# Patient Record
Sex: FEMALE | Race: OTHER | Employment: FULL TIME | URBAN - METROPOLITAN AREA
[De-identification: names, ages, dates, MRNs, and addresses within clinical notes are randomized per-mention and may not be internally consistent; named-entity substitution may affect disease eponyms.]

---

## 2017-10-30 ENCOUNTER — OFFICE VISIT (OUTPATIENT)
Dept: FAMILY MEDICINE CLINIC | Age: 47
End: 2017-10-30

## 2017-10-30 VITALS
WEIGHT: 138 LBS | TEMPERATURE: 97.4 F | SYSTOLIC BLOOD PRESSURE: 134 MMHG | HEART RATE: 70 BPM | DIASTOLIC BLOOD PRESSURE: 72 MMHG | HEIGHT: 62 IN | BODY MASS INDEX: 25.4 KG/M2 | RESPIRATION RATE: 16 BRPM

## 2017-10-30 DIAGNOSIS — Z12.4 CERVICAL CANCER SCREENING: ICD-10-CM

## 2017-10-30 DIAGNOSIS — Z00.00 PHYSICAL EXAM: Primary | ICD-10-CM

## 2017-10-30 DIAGNOSIS — R01.1 HEART MURMUR: ICD-10-CM

## 2017-10-30 DIAGNOSIS — F41.9 ANXIETY: ICD-10-CM

## 2017-10-30 DIAGNOSIS — Z12.31 ENCOUNTER FOR SCREENING MAMMOGRAM FOR BREAST CANCER: ICD-10-CM

## 2017-10-30 PROCEDURE — 99396 PREV VISIT EST AGE 40-64: CPT | Performed by: FAMILY MEDICINE

## 2017-10-30 RX ORDER — CLONAZEPAM 0.5 MG/1
0.5 TABLET ORAL 2 TIMES DAILY PRN
Qty: 30 TABLET | Refills: 1 | Status: SHIPPED | OUTPATIENT
Start: 2017-10-30

## 2017-10-30 ASSESSMENT — PATIENT HEALTH QUESTIONNAIRE - PHQ9
SUM OF ALL RESPONSES TO PHQ QUESTIONS 1-9: 0
2. FEELING DOWN, DEPRESSED OR HOPELESS: 0
SUM OF ALL RESPONSES TO PHQ9 QUESTIONS 1 & 2: 0
1. LITTLE INTEREST OR PLEASURE IN DOING THINGS: 0

## 2017-10-30 NOTE — PATIENT INSTRUCTIONS
Thank you for enrolling in [address] Please follow the instructions below to securely access your online medical record. [employer] allows you to send messages to your doctor, view your test results, renew your prescriptions, schedule appointments, and more. How Do I Sign Up? 1. In your Internet browser, go to [email or web address]  2. Click on the Sign Up Now link in the Sign In box. You will see the New Member Sign Up page. 3. Enter your [employer] Access Code exactly as it appears below. You will not need to use this code after youve completed the sign-up process. If you do not sign up before the expiration date, you must request a new code. [employer] Access Code: [email or web address]  Expires: 12/29/2017 10:21 AM    4. Enter your Social Security Number ([id number]) and Date of Birth (mm/dd/yyyy) as indicated and click Submit. You will be taken to the next sign-up page. 5. Create a [employer] ID. This will be your [employer] login ID and cannot be changed, so think of one that is secure and easy to remember. 6. Create a [employer] password. You can change your password at any time. 7. Enter your Password Reset Question and Answer. This can be used at a later time if you forget your password. 8. Enter your e-mail address. You will receive e-mail notification when new information is available in [address] 9. Click Sign Up. You can now view your medical record. Additional Information  If you have questions, please contact your physician practice where you receive care. Remember, [employer] is NOT to be used for urgent needs. For medical emergencies, dial 911.

## 2017-11-03 LAB
HPV COMMENT: NORMAL
HPV TYPE 16: NOT DETECTED
HPV TYPE 18: NOT DETECTED
HPVOH (OTHER TYPES): NOT DETECTED

## 2017-11-08 LAB
ALBUMIN SERPL-MCNC: 4.9 G/DL
ALP BLD-CCNC: 48 U/L
ALT SERPL-CCNC: 20 U/L
ANION GAP SERPL CALCULATED.3IONS-SCNC: 12 MMOL/L
AST SERPL-CCNC: 14 U/L
BILIRUB SERPL-MCNC: 0.8 MG/DL (ref 0.1–1.4)
BUN BLDV-MCNC: NORMAL MG/DL
CALCIUM SERPL-MCNC: 9.8 MG/DL
CHLORIDE BLD-SCNC: 103 MMOL/L
CHOLESTEROL, TOTAL: 201 MG/DL
CHOLESTEROL/HDL RATIO: 2.6
CO2: NORMAL MMOL/L
CREAT SERPL-MCNC: 0.6 MG/DL
GFR CALCULATED: >60
GLUCOSE BLD-MCNC: 95 MG/DL
HDLC SERPL-MCNC: 78 MG/DL (ref 35–70)
LDL CHOLESTEROL CALCULATED: 112 MG/DL (ref 0–160)
POTASSIUM SERPL-SCNC: 3.9 MMOL/L
SODIUM BLD-SCNC: 141 MMOL/L
TOTAL PROTEIN: 7.2
TRIGL SERPL-MCNC: 54 MG/DL
VLDLC SERPL CALC-MCNC: 11 MG/DL

## 2017-11-28 ENCOUNTER — OFFICE VISIT (OUTPATIENT)
Dept: FAMILY MEDICINE CLINIC | Age: 47
End: 2017-11-28

## 2017-11-28 VITALS
OXYGEN SATURATION: 99 % | DIASTOLIC BLOOD PRESSURE: 80 MMHG | HEART RATE: 77 BPM | SYSTOLIC BLOOD PRESSURE: 112 MMHG | TEMPERATURE: 99 F | RESPIRATION RATE: 14 BRPM | HEIGHT: 62 IN

## 2017-11-28 DIAGNOSIS — Z30.433 ENCOUNTER FOR REMOVAL AND REINSERTION OF INTRAUTERINE CONTRACEPTIVE DEVICE (IUD): Primary | ICD-10-CM

## 2017-11-28 DIAGNOSIS — E78.89 ELEVATED HDL: ICD-10-CM

## 2017-11-28 LAB
CONTROL: PRESENT
PREGNANCY TEST URINE, POC: NEGATIVE

## 2017-11-28 PROCEDURE — 99213 OFFICE O/P EST LOW 20 MIN: CPT | Performed by: FAMILY MEDICINE

## 2017-11-28 PROCEDURE — 58301 REMOVE INTRAUTERINE DEVICE: CPT | Performed by: FAMILY MEDICINE

## 2017-11-28 PROCEDURE — 81025 URINE PREGNANCY TEST: CPT | Performed by: FAMILY MEDICINE

## 2017-11-28 PROCEDURE — 58300 INSERT INTRAUTERINE DEVICE: CPT | Performed by: FAMILY MEDICINE

## 2017-11-28 NOTE — PROGRESS NOTES
Subjective  Paula Herrera, 52 y.o. female presents today with:  Chief Complaint   Patient presents with    Contraception     iud removal and insertion    Other     would like order for lipid blood work     Is on keto diet wants to recheck cho in spring        Past Medical History:   Diagnosis Date    Anxiety     Calcaneal fracture     Depression     Diverticulosis     Heart murmur     Toxemia      Past Surgical History:   Procedure Laterality Date    COLONOSCOPY  9/30/13,16    DR. BATEMAN,polyp needs 2019    INTRAUTERINE DEVICE INSERTION  1-2013    mirena     Social History     Social History    Marital status: Single     Spouse name: N/A    Number of children: N/A    Years of education: N/A     Occupational History    Not on file.      Social History Main Topics    Smoking status: Never Smoker    Smokeless tobacco: Never Used    Alcohol use Yes      Comment: occ    Drug use: Unknown    Sexual activity: Not on file     Other Topics Concern    Not on file     Social History Narrative    No narrative on file     Family History   Problem Relation Age of Onset    High Blood Pressure Mother     High Blood Pressure Father     Alcohol Abuse Father     Cancer Other      RECTAL     No Known Allergies  Current Outpatient Prescriptions   Medication Sig Dispense Refill    levonorgestrel (MIRENA, 52 MG,) IUD 52 mg 1 each by Intrauterine route once      clonazePAM (KLONOPIN) 0.5 MG tablet Take 1 tablet by mouth 2 times daily as needed for Anxiety 30 tablet 1    Omeprazole Magnesium (PRILOSEC OTC PO) Take 2 capsules by mouth daily      Ranitidine HCl (ZANTAC PO) Take by mouth daily      FIBER PO Take by mouth      Multiple Vitamins-Minerals (MULTIVITAMIN PO) Take by mouth      pantoprazole (PROTONIX) 40 MG tablet Take 1 tablet by mouth 2 times daily 60 tablet 1    sucralfate (CARAFATE) 1 GM tablet Take 1 tablet by mouth 4 times daily 120 tablet 1     Current Facility-Administered Medications Medication Dose Route Frequency Provider Last Rate Last Dose    levonorgestrel (MIRENA) IUD 52 mg 1 each  1 each Intrauterine Once Augusta Herrera MD         The patient denies any history of      seizures,             heart attack or KNOWN CAD        or stroke. No chest pain, shortness of breath, paroxysmal nocturnal dyspnea. No nausea, vomiting, diarrhea, hematochezia or melena. No paresthesias or headaches. No dysuria, frequency or hematuria. Last labs  Orders Only on 11/14/2017   Component Date Value Ref Range Status    Sodium 11/08/2017 141  mmol/L Final    Chloride 11/08/2017 103  mmol/L Final    Potassium 11/08/2017 3.9  mmol/L Final    CREATININE 11/08/2017 0.60   Final    Glucose 11/08/2017 95  mg/dL Final    AST 11/08/2017 14  U/L Final    ALT 11/08/2017 20  U/L Final    Calcium 11/08/2017 9.8  mg/dL Final    Total Protein 11/08/2017 7.2   Final    Alb 11/08/2017 4.9   Final    Alkaline Phosphatase 11/08/2017 48  U/L Final    Total Bilirubin 11/08/2017 0.8  0.1 - 1.4 mg/dL Final    Gfr Calculated 11/08/2017 >60   Final    Anion Gap 11/08/2017 12  mmol/L Final    Cholesterol, Total 11/08/2017 201  mg/dL Final    HDL 11/08/2017 78* 35 - 70 mg/dL Final    LDL Calculated 11/08/2017 112  0 - 160 mg/dL Final    Triglycerides 11/08/2017 54  mg/dL Final    Chol/HDL Ratio 11/08/2017 2.6   Final    VLDL 11/08/2017 11  mg/dL Final   Orders Only on 10/30/2017   Component Date Value Ref Range Status    HPV TYPE 16 11/03/2017 Not Detected  Not Detected Final    HPV TYPE 18 11/03/2017 Not Detected  Not Detected Final    HPVOH (OTHER TYPES) 11/03/2017 Not Detected  Not Detected Final    HPV Comment 11/03/2017 See below   Final    Comment: **This is information only. See above for results. **    HPV other genotypes: 13,68,68,14,93,62,18,04,16,70,66,42    The [employer] Danita HPV Test is a qualitative in-vitro test for  the detection of Human Papillomavirus that provides specific  genotyping information for HPV Types 16 and 18, while  concurrently detecting 12 other high-risk HPV types 31,33,35,  84,88,65,54,71,68,70,84,02 in a pooled result. The test  utilizes amplification of target DNA by Polymerase Chain  Reaction (PCR) and nucleic acid hybridization. [facility]   Topic Date Due    HIV screen  04/17/1985    DTaP/Tdap/Td vaccine (1 - Tdap) 04/30/2018 (Originally 4/17/1989)    Cervical cancer screen  10/30/2022    Lipid screen  11/08/2022    Flu vaccine  Completed       Results for POC orders placed in visit on 11/28/17   POCT urine pregnancy   Result Value Ref Range    Preg Test, Ur negative     Control present          Objective    Vitals:    11/28/17 1350   BP: 112/80   Pulse: 77   Resp: 14   Temp: 99 °F (37.2 °C)   TempSrc: Temporal   SpO2: 99%   Height: 5' 2\" (1.575 m)       PHYSICAL EXAMINATION:        GENERAL:    The patient appears well nourished and well-developed,     Normal affect. Not appearing significantly anxious or depressed. No acute respiratory distress. Alert and oriented times 3. Skin:     No skin rashes. No concerning moles observed. Gait:    Normal gait. No ataxia. HEENT:  Normocephalic, atraumatic. Throat:  Pharynx is clear, no erythema/ edema or exudates   Ears:    TMs normal bilaterally. Canals and ears normal   Eyes:  Extraocular eye motions intact and pain free. Pupils reactive/equal    Sclerae and conjunctivae clear    NECK: No masses or adenopathy palpable. No carotid bruits heard. No asymmetry visible. No thyromegaly. RESPIRATORY:   Clear/ Equal breath sounds /No acute respiratory distress. No wheezes,rales, or rhonchi. No percussive abnormalities    HEART: Regular rhythm without murmur, rub or gallop. ABDOMEN:  Soft, non tender. No masses, guarding or rebound. Normo active bowel sounds. EXTREMITIES:  No edema in any extremity. No cyanosis or clubbing.     2+ dorsalis pedis pulses bilaterally    iud removed sans difficulty   Fully consented for IUD insertion d/w pt risks of bleeding/uterine rupture/iud migration/pregnancy threatening and termination if happened to be early in pregnancy or pregnancy w IUD in -unlikely but possible which can have complications or death resulting to fetus or pt. Pt. prepped and draped in normal fashion cleansed cervix w betadine, small amt of blood from end of menses present and urine pregnancy (-)   Inserted mirena sans difficulty. Strings trimmed  Minimal discomfort or cramping. Signs/symptoms of concern including but not limited to cramping/excessive bleeding/IUD expulsion etc d/w pt necessitating call or ER. Follow up needed for IUD check in 2 months. Assessment & Plan   1. Encounter for removal and reinsertion of intrauterine contraceptive device (IUD)  levonorgestrel (MIRENA) IUD 52 mg 1 each    44366 - [place] INSERT INTRAUTERINE DEVICE    92784 - [place] REMOVE INTRAUTERINE DEVICE    POCT urine pregnancy   2. Elevated HDL  Lipid Panel     Orders Placed This Encounter   Procedures    Lipid Panel     Standing Status:   Future     Standing Expiration Date:   11/28/2018     Order Specific Question:   Is Patient Fasting?/# of Hours     Answer:   ?    POCT urine pregnancy    27124 - [place] INSERT INTRAUTERINE DEVICE    39785 - [place] REMOVE INTRAUTERINE DEVICE     Orders Placed This Encounter   Medications    levonorgestrel (MIRENA) IUD 52 mg 1 each     There are no discontinued medications. No Follow-up on file.   Signs/sxs of concern d/w pt    Lowell Wright MD

## 2017-12-05 ENCOUNTER — TELEPHONE (OUTPATIENT)
Dept: FAMILY MEDICINE CLINIC | Age: 47
End: 2017-12-05

## 2017-12-05 DIAGNOSIS — R92.8 ABNORMAL MAMMOGRAM: Primary | ICD-10-CM

## 2017-12-27 DIAGNOSIS — R92.8 ABNORMAL MAMMOGRAM: ICD-10-CM

## 2018-05-23 ENCOUNTER — TELEPHONE (OUTPATIENT)
Dept: FAMILY MEDICINE CLINIC | Age: 48
End: 2018-05-23

## 2018-06-21 ENCOUNTER — PATIENT MESSAGE (OUTPATIENT)
Dept: FAMILY MEDICINE CLINIC | Age: 48
End: 2018-06-21

## 2018-06-22 RX ORDER — CIPROFLOXACIN 500 MG/1
500 TABLET, FILM COATED ORAL 2 TIMES DAILY
Qty: 20 TABLET | Refills: 0 | Status: SHIPPED | OUTPATIENT
Start: 2018-06-22 | End: 2018-07-02

## 2018-06-22 RX ORDER — FLUCONAZOLE 150 MG/1
150 TABLET ORAL ONCE
Qty: 1 TABLET | Refills: 0 | Status: SHIPPED | OUTPATIENT
Start: 2018-06-22 | End: 2018-06-22

## 2018-06-22 RX ORDER — METRONIDAZOLE 500 MG/1
500 TABLET ORAL 3 TIMES DAILY
Qty: 30 TABLET | Refills: 0 | Status: SHIPPED | OUTPATIENT
Start: 2018-06-22 | End: 2018-07-02

## 2018-08-23 ENCOUNTER — PATIENT MESSAGE (OUTPATIENT)
Dept: FAMILY MEDICINE CLINIC | Age: 48
End: 2018-08-23

## 2018-08-23 DIAGNOSIS — R10.13 EPIGASTRIC PAIN: ICD-10-CM

## 2018-08-24 RX ORDER — ONDANSETRON 4 MG/1
4 TABLET, FILM COATED ORAL EVERY 8 HOURS PRN
Qty: 40 TABLET | Refills: 1 | Status: SHIPPED | OUTPATIENT
Start: 2018-08-24

## 2018-08-24 RX ORDER — PANTOPRAZOLE SODIUM 40 MG/1
40 TABLET, DELAYED RELEASE ORAL 2 TIMES DAILY
Qty: 60 TABLET | Refills: 1 | Status: SHIPPED | OUTPATIENT
Start: 2018-08-24

## 2018-08-24 RX ORDER — SUCRALFATE 1 G/1
1 TABLET ORAL 4 TIMES DAILY
Qty: 120 TABLET | Refills: 1 | Status: SHIPPED | OUTPATIENT
Start: 2018-08-24

## 2023-01-18 ENCOUNTER — HOSPITAL ENCOUNTER (OUTPATIENT)
Dept: LAB | Age: 53
Discharge: HOME | End: 2023-01-18
Payer: COMMERCIAL

## 2023-01-18 LAB
ALBUMIN SERPL-MCNC: 4.5 G/DL (ref 3.5–4.6)
ALP BLD-CCNC: 67 U/L (ref 40–130)
ALT SERPL-CCNC: 11 U/L (ref 0–33)
ANION GAP SERPL CALCULATED.3IONS-SCNC: 10 MEQ/L (ref 9–15)
AST SERPL-CCNC: 14 U/L (ref 0–35)
BILIRUB SERPL-MCNC: 0.6 MG/DL (ref 0.2–0.7)
BUN BLDV-MCNC: 13 MG/DL (ref 6–20)
CALCIUM SERPL-MCNC: 9.2 MG/DL (ref 8.5–9.9)
CHLORIDE BLD-SCNC: 106 MEQ/L (ref 95–107)
CHOLESTEROL, TOTAL: 178 MG/DL (ref 0–199)
CO2: 26 MEQ/L (ref 20–31)
CREAT SERPL-MCNC: 0.55 MG/DL (ref 0.5–0.9)
FOLLICLE STIMULATING HORMONE: 62.2 MIU/ML (ref 1.7–21.5)
GFR SERPL CREATININE-BSD FRML MDRD: >60 ML/MIN/{1.73_M2}
GLOBULIN: 1.8 G/DL (ref 2.3–3.5)
GLUCOSE BLD-MCNC: 87 MG/DL (ref 70–99)
HBA1C MFR BLD: 5.5 % (ref 4.8–5.9)
HCT VFR BLD CALC: 38.9 % (ref 37–47)
HDLC SERPL-MCNC: 59 MG/DL (ref 40–59)
HEMOGLOBIN: 13.3 G/DL (ref 12–16)
LDL CHOLESTEROL CALCULATED: 102 MG/DL (ref 0–129)
LH: 44.9 MIU/ML (ref 1–95.6)
MCH RBC QN AUTO: 29.5 PG (ref 27–31.3)
MCHC RBC AUTO-ENTMCNC: 34.1 % (ref 33–37)
MCV RBC AUTO: 86.5 FL (ref 79.4–94.8)
PDW BLD-RTO: 12.7 % (ref 11.5–14.5)
PLATELET # BLD: 274 K/UL (ref 130–400)
POTASSIUM SERPL-SCNC: 4.3 MEQ/L (ref 3.4–4.9)
RBC # BLD: 4.5 M/UL (ref 4.2–5.4)
SODIUM BLD-SCNC: 142 MEQ/L (ref 135–144)
T4 FREE: 1.17 NG/DL (ref 0.84–1.68)
TOTAL PROTEIN: 6.3 G/DL (ref 6.3–8)
TRIGL SERPL-MCNC: 85 MG/DL (ref 0–150)
TSH SERPL DL<=0.05 MIU/L-ACNC: 1.95 UIU/ML (ref 0.44–3.86)
WBC # BLD: 5.2 K/UL (ref 4.8–10.8)

## 2023-01-18 PROCEDURE — 84439 ASSAY OF FREE THYROXINE: CPT

## 2023-01-18 PROCEDURE — 83036 HEMOGLOBIN GLYCOSYLATED A1C: CPT

## 2023-01-18 PROCEDURE — 85027 COMPLETE CBC AUTOMATED: CPT

## 2023-01-18 PROCEDURE — 84443 ASSAY THYROID STIM HORMONE: CPT

## 2023-01-18 PROCEDURE — 83001 ASSAY OF GONADOTROPIN (FSH): CPT

## 2023-01-18 PROCEDURE — 80061 LIPID PANEL: CPT

## 2023-01-18 PROCEDURE — 80053 COMPREHEN METABOLIC PANEL: CPT

## 2023-01-18 PROCEDURE — 36415 COLL VENOUS BLD VENIPUNCTURE: CPT

## 2023-01-18 PROCEDURE — 83002 ASSAY OF GONADOTROPIN (LH): CPT

## 2023-01-24 ENCOUNTER — HOSPITAL ENCOUNTER (OUTPATIENT)
Dept: WOMENS IMAGING | Age: 53
Discharge: HOME | End: 2023-01-26
Payer: COMMERCIAL

## 2023-01-24 DIAGNOSIS — Z12.31 ENCOUNTER FOR SCREENING MAMMOGRAM FOR MALIGNANT NEOPLASM OF BREAST: ICD-10-CM

## 2023-01-24 PROCEDURE — 77067 SCR MAMMO BI INCL CAD: CPT

## 2023-08-03 RX ORDER — NEBIVOLOL 5 MG/1
5 TABLET ORAL DAILY
COMMUNITY
Start: 2023-02-20 | End: 2023-08-07

## 2023-08-07 DIAGNOSIS — R73.9 HYPERGLYCEMIA: ICD-10-CM

## 2023-08-07 DIAGNOSIS — I10 HYPERTENSION, UNSPECIFIED TYPE: ICD-10-CM

## 2023-08-07 RX ORDER — LOSARTAN POTASSIUM 50 MG/1
50 TABLET ORAL DAILY
COMMUNITY
End: 2024-01-10 | Stop reason: SDUPTHER

## 2023-09-08 ENCOUNTER — HOSPITAL ENCOUNTER (OUTPATIENT)
Age: 53
Setting detail: SPECIMEN
Discharge: HOME | End: 2023-09-08
Payer: COMMERCIAL

## 2023-09-08 ENCOUNTER — OFFICE VISIT (OUTPATIENT)
Dept: FAMILY MEDICINE CLINIC | Age: 53
End: 2023-09-08
Payer: COMMERCIAL

## 2023-09-08 VITALS
SYSTOLIC BLOOD PRESSURE: 118 MMHG | HEART RATE: 61 BPM | DIASTOLIC BLOOD PRESSURE: 76 MMHG | HEIGHT: 62 IN | BODY MASS INDEX: 28.3 KG/M2 | WEIGHT: 153.8 LBS | OXYGEN SATURATION: 96 %

## 2023-09-08 DIAGNOSIS — N30.00 ACUTE CYSTITIS WITHOUT HEMATURIA: Primary | ICD-10-CM

## 2023-09-08 DIAGNOSIS — R39.9 UTI SYMPTOMS: ICD-10-CM

## 2023-09-08 DIAGNOSIS — J01.10 ACUTE NON-RECURRENT FRONTAL SINUSITIS: ICD-10-CM

## 2023-09-08 DIAGNOSIS — N30.00 ACUTE CYSTITIS WITHOUT HEMATURIA: ICD-10-CM

## 2023-09-08 LAB
BILIRUBIN, POC: NORMAL
BLOOD URINE, POC: NORMAL
CLARITY, POC: CLEAR
COLOR, POC: YELLOW
GLUCOSE URINE, POC: NORMAL
KETONES, POC: NORMAL
LEUKOCYTE EST, POC: 70
NITRITE, POC: NORMAL
PH, POC: 7
PROTEIN, POC: 0.15
SPECIFIC GRAVITY, POC: 1.01
UROBILINOGEN, POC: 3.5

## 2023-09-08 PROCEDURE — 87086 URINE CULTURE/COLONY COUNT: CPT

## 2023-09-08 PROCEDURE — 99203 OFFICE O/P NEW LOW 30 MIN: CPT

## 2023-09-08 PROCEDURE — 81002 URINALYSIS NONAUTO W/O SCOPE: CPT

## 2023-09-08 RX ORDER — LOSARTAN POTASSIUM 50 MG/1
50 TABLET ORAL DAILY
COMMUNITY

## 2023-09-08 RX ORDER — AMOXICILLIN AND CLAVULANATE POTASSIUM 875; 125 MG/1; MG/1
1 TABLET, FILM COATED ORAL 2 TIMES DAILY
Qty: 20 TABLET | Refills: 0 | Status: SHIPPED | OUTPATIENT
Start: 2023-09-08 | End: 2023-09-18

## 2023-09-08 ASSESSMENT — ENCOUNTER SYMPTOMS
NAUSEA: 0
BACK PAIN: 0
COUGH: 0
RESPIRATORY NEGATIVE: 1
ABDOMINAL PAIN: 0
SINUS PAIN: 1

## 2023-09-08 NOTE — PATIENT INSTRUCTIONS
Increase water intake and reduced intake of sweets and sweetened beverages for the next few days. Take full course of your antibiotics, even if symptoms improve. If not seeing resolution of symptoms in 2 days then follow-up for reevaluation.

## 2023-09-10 LAB — BACTERIA UR CULT: NORMAL

## 2023-11-27 RX ORDER — BUPROPION HYDROCHLORIDE 150 MG/1
150 TABLET ORAL DAILY
Qty: 90 TABLET | Refills: 0 | Status: CANCELLED | OUTPATIENT
Start: 2023-11-27

## 2023-11-27 RX ORDER — BUPROPION HYDROCHLORIDE 150 MG/1
1 TABLET ORAL DAILY
COMMUNITY
Start: 2021-04-13

## 2023-12-28 PROBLEM — G47.9 SLEEP DIFFICULTIES: Status: ACTIVE | Noted: 2023-12-28

## 2023-12-28 PROBLEM — M22.2X1 PATELLOFEMORAL PAIN SYNDROME OF RIGHT KNEE: Status: ACTIVE | Noted: 2023-12-28

## 2023-12-28 PROBLEM — K25.9 STOMACH ULCER: Status: ACTIVE | Noted: 2023-12-28

## 2023-12-28 PROBLEM — K59.09 CHRONIC CONSTIPATION: Status: ACTIVE | Noted: 2023-12-28

## 2023-12-28 PROBLEM — K57.32 DIVERTICULITIS OF COLON: Status: ACTIVE | Noted: 2023-12-28

## 2023-12-28 PROBLEM — K57.90 DIVERTICULOSIS OF INTESTINE: Status: ACTIVE | Noted: 2023-12-28

## 2023-12-28 PROBLEM — R92.8 ABNORMAL MAMMOGRAM: Status: ACTIVE | Noted: 2023-12-28

## 2023-12-28 PROBLEM — L91.0 KELOID CICATRIX: Status: ACTIVE | Noted: 2023-12-28

## 2023-12-28 PROBLEM — L91.0 HYPERTROPHIC SCAR: Status: ACTIVE | Noted: 2023-12-28

## 2023-12-28 PROBLEM — H90.3 BILATERAL SENSORINEURAL HEARING LOSS: Status: ACTIVE | Noted: 2023-12-28

## 2024-01-10 ENCOUNTER — HOSPITAL ENCOUNTER (OUTPATIENT)
Dept: RADIOLOGY | Facility: EXTERNAL LOCATION | Age: 54
Discharge: HOME | End: 2024-01-10

## 2024-01-10 ENCOUNTER — OFFICE VISIT (OUTPATIENT)
Dept: PRIMARY CARE | Facility: CLINIC | Age: 54
End: 2024-01-10
Payer: COMMERCIAL

## 2024-01-10 VITALS
DIASTOLIC BLOOD PRESSURE: 86 MMHG | OXYGEN SATURATION: 98 % | WEIGHT: 155 LBS | HEART RATE: 58 BPM | SYSTOLIC BLOOD PRESSURE: 138 MMHG | RESPIRATION RATE: 20 BRPM | HEIGHT: 62 IN | TEMPERATURE: 97.5 F | BODY MASS INDEX: 28.52 KG/M2

## 2024-01-10 DIAGNOSIS — F41.9 ANXIETY AND DEPRESSION: ICD-10-CM

## 2024-01-10 DIAGNOSIS — Z12.31 ENCOUNTER FOR SCREENING MAMMOGRAM FOR BREAST CANCER: Primary | ICD-10-CM

## 2024-01-10 DIAGNOSIS — F41.9 ANXIETY: ICD-10-CM

## 2024-01-10 DIAGNOSIS — Z13.220 LIPID SCREENING: ICD-10-CM

## 2024-01-10 DIAGNOSIS — F32.A ANXIETY AND DEPRESSION: ICD-10-CM

## 2024-01-10 DIAGNOSIS — I10 HYPERTENSION, UNSPECIFIED TYPE: ICD-10-CM

## 2024-01-10 DIAGNOSIS — Z12.31 ENCOUNTER FOR SCREENING MAMMOGRAM FOR BREAST CANCER: ICD-10-CM

## 2024-01-10 PROCEDURE — 3075F SYST BP GE 130 - 139MM HG: CPT | Performed by: PHYSICIAN ASSISTANT

## 2024-01-10 PROCEDURE — 3079F DIAST BP 80-89 MM HG: CPT | Performed by: PHYSICIAN ASSISTANT

## 2024-01-10 PROCEDURE — 1036F TOBACCO NON-USER: CPT | Performed by: PHYSICIAN ASSISTANT

## 2024-01-10 PROCEDURE — 99213 OFFICE O/P EST LOW 20 MIN: CPT | Performed by: PHYSICIAN ASSISTANT

## 2024-01-10 RX ORDER — SERTRALINE HYDROCHLORIDE 25 MG/1
25 TABLET, FILM COATED ORAL DAILY
Qty: 30 TABLET | Refills: 1 | Status: SHIPPED | OUTPATIENT
Start: 2024-01-10 | End: 2024-03-19 | Stop reason: SDUPTHER

## 2024-01-10 RX ORDER — BISMUTH SUBSALICYLATE 262 MG
1 TABLET,CHEWABLE ORAL DAILY
COMMUNITY

## 2024-01-10 RX ORDER — BUSPIRONE HYDROCHLORIDE 10 MG/1
10 TABLET ORAL 3 TIMES DAILY PRN
Qty: 90 TABLET | Refills: 1 | Status: SHIPPED | OUTPATIENT
Start: 2024-01-10

## 2024-01-10 RX ORDER — LOSARTAN POTASSIUM 50 MG/1
50 TABLET ORAL DAILY
Qty: 90 TABLET | Refills: 1 | Status: SHIPPED | OUTPATIENT
Start: 2024-01-10 | End: 2024-05-08 | Stop reason: WASHOUT

## 2024-01-10 ASSESSMENT — ANXIETY QUESTIONNAIRES
IF YOU CHECKED OFF ANY PROBLEMS ON THIS QUESTIONNAIRE, HOW DIFFICULT HAVE THESE PROBLEMS MADE IT FOR YOU TO DO YOUR WORK, TAKE CARE OF THINGS AT HOME, OR GET ALONG WITH OTHER PEOPLE: SOMEWHAT DIFFICULT
1. FEELING NERVOUS, ANXIOUS, OR ON EDGE: SEVERAL DAYS
7. FEELING AFRAID AS IF SOMETHING AWFUL MIGHT HAPPEN: NOT AT ALL
3. WORRYING TOO MUCH ABOUT DIFFERENT THINGS: NEARLY EVERY DAY
2. NOT BEING ABLE TO STOP OR CONTROL WORRYING: MORE THAN HALF THE DAYS
4. TROUBLE RELAXING: NEARLY EVERY DAY
6. BECOMING EASILY ANNOYED OR IRRITABLE: SEVERAL DAYS
5. BEING SO RESTLESS THAT IT IS HARD TO SIT STILL: SEVERAL DAYS
GAD7 TOTAL SCORE: 11

## 2024-01-10 ASSESSMENT — PATIENT HEALTH QUESTIONNAIRE - PHQ9
4. FEELING TIRED OR HAVING LITTLE ENERGY: SEVERAL DAYS
SUM OF ALL RESPONSES TO PHQ9 QUESTIONS 1 AND 2: 2
2. FEELING DOWN, DEPRESSED OR HOPELESS: SEVERAL DAYS
9. THOUGHTS THAT YOU WOULD BE BETTER OFF DEAD, OR OF HURTING YOURSELF: NOT AT ALL
1. LITTLE INTEREST OR PLEASURE IN DOING THINGS: SEVERAL DAYS
SUM OF ALL RESPONSES TO PHQ QUESTIONS 1-9: 14
8. MOVING OR SPEAKING SO SLOWLY THAT OTHER PEOPLE COULD HAVE NOTICED. OR THE OPPOSITE, BEING SO FIGETY OR RESTLESS THAT YOU HAVE BEEN MOVING AROUND A LOT MORE THAN USUAL: MORE THAN HALF THE DAYS
6. FEELING BAD ABOUT YOURSELF - OR THAT YOU ARE A FAILURE OR HAVE LET YOURSELF OR YOUR FAMILY DOWN: NEARLY EVERY DAY
3. TROUBLE FALLING OR STAYING ASLEEP OR SLEEPING TOO MUCH: NEARLY EVERY DAY
5. POOR APPETITE OR OVEREATING: NOT AT ALL
7. TROUBLE CONCENTRATING ON THINGS, SUCH AS READING THE NEWSPAPER OR WATCHING TELEVISION: NEARLY EVERY DAY

## 2024-01-10 NOTE — PROGRESS NOTES
"Subjective   Patient ID: Monet Aguilar is a 53 y.o. female who presents for Breast Pain (Dr Mlaave pt here today for right sided breast pain since end of Nov, beginning of Dec with nipple itching that she has had for years since her breast augmentation years ago. States the pain is gone now. Pt states last Mamm was Jan 2023, so needs an order printed because she goes to [employer]. ) and Follow-up (Pt would also like routine blood work printed because she goes through [employer] [facility] [employer]; along with hormone level to possibly get her Mirena taken out. Pt wanting Xanax for her nerves due to her father just passing away and having to take care of everything and not sleeping. Wants back on Wellbutrin as well. ).    HPI     Right breast pain:   Had breast aug years ago and ever sincfe then has had some itching   End of Nove and beg dec started having breast pain near a[place]ola - tender and hjurt and itching got really bad at the same time.   Was using heating pad and doing breast massage - those helped because pain has resolved now and itching is minimal and nearly gone     Anxiety and depression  - father passed away and is handlign his estate   - Mom had a stroke   - A lot of anxiety with everything, not sleeping much   - In general has underlying anxiety and depression   - Coping strategies: keeping busy, cries a lot   - Used to be on Wellbutrin (has been off for over a year because was doing so well)   - Talked to a counselor - didn't have a good connection   - Low libido lately   - Tried Lexapro before years ago (can't remember who she did with that), was on Celexa years ago in her 20s, Effexor (can't remember)   - Has tried Xanax and Klonopin     Review of Systems   Psychiatric/Behavioral:  Positive for dysphoric mood. The patient is nervous/anxious.        Objective   /86   Pulse 58   Temp 36.4 °C (97.5 °F)   Resp 20   Ht 1.575 m (5' 2\")   Wt 70.3 kg (155 lb)   SpO2 98%   BMI 28.35 kg/m²     Physical " Exam  Constitutional:       Appearance: Normal appearance.   Cardiovascular:      Rate and Rhythm: Normal rate and regular rhythm.      Pulses: Normal pulses.      Heart sounds: Normal heart sounds. No murmur heard.  Pulmonary:      Effort: Pulmonary effort is normal.      Breath sounds: Normal breath sounds.   Neurological:      Mental Status: She is alert.   Psychiatric:         Mood and Affect: Mood and affect normal.         Behavior: Behavior normal.         Thought Content: Thought content normal.         Judgment: Judgment normal.         Assessment/Plan     Problem List Items Addressed This Visit    None  Visit Diagnoses       Encounter for screening mammogram for breast cancer    -  Primary    Relevant Orders    BI mammo bilateral screening tomosynthesis (Completed)    Hypertension, unspecified type        Relevant Medications    losartan (Cozaar) 50 mg tablet    Other Relevant Orders    Lipid Panel    CBC    Comprehensive Metabolic Panel    Hemoglobin A1C    Vitamin B12    FSH & LH    Anxiety and depression        Relevant Medications    sertraline (Zoloft) 25 mg tablet    Other Relevant Orders    Follow Up In [facility]    Lipid Panel    CBC    Comprehensive Metabolic Panel    Hemoglobin A1C    Vitamin B12    FSH & LH    Anxiety        Relevant Medications    busPIRone (Buspar) 10 mg tablet    Lipid screening        Relevant Orders    Lipid Panel            Breast pain has now resolved but due for mammogram - ordered today     Anxiety and depression worse again now after loss of her father and trying to handle his estate.   - Has tried a few meds previous   - Discussed options at length today - rx sent for Zoloft and buspar   - Referral placed to [facility] for counseling

## 2024-01-11 ASSESSMENT — ENCOUNTER SYMPTOMS
NERVOUS/ANXIOUS: 1
DYSPHORIC MOOD: 1

## 2024-02-01 ENCOUNTER — HOSPITAL ENCOUNTER (OUTPATIENT)
Dept: WOMENS IMAGING | Age: 54
Discharge: HOME | End: 2024-02-03
Payer: COMMERCIAL

## 2024-02-01 ENCOUNTER — HOSPITAL ENCOUNTER (OUTPATIENT)
Dept: LAB | Age: 54
Discharge: HOME | End: 2024-02-01
Payer: COMMERCIAL

## 2024-02-01 DIAGNOSIS — Z12.31 ENCOUNTER FOR SCREENING MAMMOGRAM FOR MALIGNANT NEOPLASM OF BREAST: ICD-10-CM

## 2024-02-01 LAB
ALBUMIN SERPL-MCNC: 4.5 G/DL (ref 3.5–4.6)
ALP SERPL-CCNC: 72 U/L (ref 40–130)
ALT SERPL-CCNC: 20 U/L (ref 0–33)
ANION GAP SERPL CALCULATED.3IONS-SCNC: 10 MEQ/L (ref 9–15)
AST SERPL-CCNC: 17 U/L (ref 0–35)
BILIRUB SERPL-MCNC: 0.5 MG/DL (ref 0.2–0.7)
BUN SERPL-MCNC: 17 MG/DL (ref 6–20)
CALCIUM SERPL-MCNC: 9.3 MG/DL (ref 8.5–9.9)
CHLORIDE SERPL-SCNC: 106 MEQ/L (ref 95–107)
CHOLEST SERPL-MCNC: 197 MG/DL (ref 0–199)
CO2 SERPL-SCNC: 26 MEQ/L (ref 20–31)
CREAT SERPL-MCNC: 0.55 MG/DL (ref 0.5–0.9)
ERYTHROCYTE [DISTWIDTH] IN BLOOD BY AUTOMATED COUNT: 12.1 % (ref 11.5–14.5)
GLOBULIN SER CALC-MCNC: 2.3 G/DL (ref 2.3–3.5)
GLUCOSE SERPL-MCNC: 98 MG/DL (ref 70–99)
HBA1C MFR BLD: 5.4 % (ref 4.8–5.9)
HCT VFR BLD AUTO: 41.3 % (ref 37–47)
HDLC SERPL-MCNC: 55 MG/DL (ref 40–59)
HGB BLD-MCNC: 14 G/DL (ref 12–16)
LDLC SERPL CALC-MCNC: 122 MG/DL (ref 0–129)
MCH RBC QN AUTO: 29 PG (ref 27–31.3)
MCHC RBC AUTO-ENTMCNC: 33.9 % (ref 33–37)
MCV RBC AUTO: 85.7 FL (ref 79.4–94.8)
PLATELET # BLD AUTO: 281 K/UL (ref 130–400)
POTASSIUM SERPL-SCNC: 4.3 MEQ/L (ref 3.4–4.9)
PROT SERPL-MCNC: 6.8 G/DL (ref 6.3–8)
RBC # BLD AUTO: 4.82 M/UL (ref 4.2–5.4)
SODIUM SERPL-SCNC: 142 MEQ/L (ref 135–144)
TRIGL SERPL-MCNC: 101 MG/DL (ref 0–150)
WBC # BLD AUTO: 5.3 K/UL (ref 4.8–10.8)

## 2024-02-01 PROCEDURE — 83001 ASSAY OF GONADOTROPIN (FSH): CPT

## 2024-02-01 PROCEDURE — 83002 ASSAY OF GONADOTROPIN (LH): CPT

## 2024-02-01 PROCEDURE — 85027 COMPLETE CBC AUTOMATED: CPT

## 2024-02-01 PROCEDURE — 80061 LIPID PANEL: CPT

## 2024-02-01 PROCEDURE — 82607 VITAMIN B-12: CPT

## 2024-02-01 PROCEDURE — 36415 COLL VENOUS BLD VENIPUNCTURE: CPT

## 2024-02-01 PROCEDURE — 83036 HEMOGLOBIN GLYCOSYLATED A1C: CPT

## 2024-02-01 PROCEDURE — 80053 COMPREHEN METABOLIC PANEL: CPT

## 2024-02-01 PROCEDURE — 77063 BREAST TOMOSYNTHESIS BI: CPT

## 2024-02-01 PROCEDURE — 77067 SCR MAMMO BI INCL CAD: CPT

## 2024-02-02 ENCOUNTER — TELEPHONE (OUTPATIENT)
Dept: PRIMARY CARE | Facility: CLINIC | Age: 54
End: 2024-02-02
Payer: COMMERCIAL

## 2024-02-02 LAB
FOLLICLE STIMULATING HORMONE: 98.6 MIU/ML
LH: 40.2 MIU/ML (ref 1.7–8.6)
VITAMIN B-12: 1084 PG/ML (ref 232–1245)

## 2024-02-02 NOTE — PROGRESS NOTES
Outreach #1: Writer attempted to outreach pt regarding their referral to [facility]. Patient reported being at work and requested that writer outreach her at a later date/planned to outreach patient next Friday Feb 9th.

## 2024-03-19 DIAGNOSIS — F41.9 ANXIETY AND DEPRESSION: ICD-10-CM

## 2024-03-19 DIAGNOSIS — F32.A ANXIETY AND DEPRESSION: ICD-10-CM

## 2024-03-19 RX ORDER — SERTRALINE HYDROCHLORIDE 25 MG/1
25 TABLET, FILM COATED ORAL DAILY
Qty: 30 TABLET | Refills: 1 | Status: SHIPPED | OUTPATIENT
Start: 2024-03-19 | End: 2024-05-18

## 2024-05-08 ENCOUNTER — E-VISIT (OUTPATIENT)
Dept: PRIMARY CARE | Facility: CLINIC | Age: 54
End: 2024-05-08
Payer: COMMERCIAL

## 2024-05-08 DIAGNOSIS — I10 ESSENTIAL HYPERTENSION: Primary | ICD-10-CM

## 2024-05-08 PROCEDURE — 99421 OL DIG E/M SVC 5-10 MIN: CPT | Performed by: FAMILY MEDICINE

## 2024-05-08 RX ORDER — AMLODIPINE BESYLATE 2.5 MG/1
2.5 TABLET ORAL DAILY
Qty: 30 TABLET | Refills: 5 | Status: SHIPPED | OUTPATIENT
Start: 2024-05-08 | End: 2024-11-04

## 2024-05-08 NOTE — TELEPHONE ENCOUNTER
HAL BAUER  Will make this an EVISIT since I have not seen you in a while.    Would STOP cozaar to make sure its actually the med and not typical perimenopause symptoms which all those complaints COULD be(and believe me-same age range here often IS the case)HOWEVER- if symptoms resolve-obviously is the med.  So we would avoid ACE-inhibitors due to similarity and choose either amlodipine like 2.5mg or bystolic/toprol(beta blockers)  I can send amlodipine now if you agree and will complete this EVISIT  Please email me bps in a few wks  If you DONT like med-let me know-as you probably know it works GREAT for BP but only causes swelling at usually 10mg  Sent to [facility]     The purpose of an E VISIT is a convenient, affordable way to receive a treatment plan for mild to moderate illnesses or problems. An E VISIT is limited in its ability to diagnose and treat and is not as thorough nor helpful as an in person visit and examination. It must be understood that nothing replaces EMERGENCY ROOM CARE /Calling 911 for emergencies or any pain of severe nature-not limited to but certainly including chest, abdominal, or headache pain and any bleeding that cannot be easily stopped, dizziness/ light headedness and many other urgent symptoms NOT mentioned some include symptoms of heart attack or stroke.     We are doing an E VISIT to help YOU as a convenience to get tests ordered or treatments started-saving you time. Especially since multiple messages are sent each day to our office and some medical issues can be streamlined or investigations and/ or treatment started by Email methods. However, many are NOT conducive to this email type communication.  Also-we may initially select an email visit as appropriate but unwanted side effects of medicine or new concerns may arise-in which case -another E VISIT is INAPPROPRIATE and should result in an in-person visit.  Thank you.    Dr teixeira

## 2024-08-01 PROBLEM — I10 BENIGN ESSENTIAL HYPERTENSION: Status: ACTIVE | Noted: 2024-08-01

## 2024-08-05 ENCOUNTER — APPOINTMENT (OUTPATIENT)
Dept: PRIMARY CARE | Facility: CLINIC | Age: 54
End: 2024-08-05
Payer: COMMERCIAL

## 2024-08-05 VITALS
HEART RATE: 70 BPM | BODY MASS INDEX: 28.16 KG/M2 | WEIGHT: 153 LBS | HEIGHT: 62 IN | DIASTOLIC BLOOD PRESSURE: 78 MMHG | TEMPERATURE: 97.1 F | RESPIRATION RATE: 16 BRPM | SYSTOLIC BLOOD PRESSURE: 148 MMHG | OXYGEN SATURATION: 98 %

## 2024-08-05 DIAGNOSIS — F41.9 ANXIETY AND DEPRESSION: ICD-10-CM

## 2024-08-05 DIAGNOSIS — I10 BENIGN ESSENTIAL HYPERTENSION: ICD-10-CM

## 2024-08-05 DIAGNOSIS — M54.31 RIGHT SCIATIC NERVE PAIN: Primary | ICD-10-CM

## 2024-08-05 DIAGNOSIS — F32.A ANXIETY AND DEPRESSION: ICD-10-CM

## 2024-08-05 DIAGNOSIS — I10 ESSENTIAL HYPERTENSION: ICD-10-CM

## 2024-08-05 PROCEDURE — 3008F BODY MASS INDEX DOCD: CPT | Performed by: FAMILY MEDICINE

## 2024-08-05 PROCEDURE — 99214 OFFICE O/P EST MOD 30 MIN: CPT | Performed by: FAMILY MEDICINE

## 2024-08-05 PROCEDURE — 1036F TOBACCO NON-USER: CPT | Performed by: FAMILY MEDICINE

## 2024-08-05 PROCEDURE — 3077F SYST BP >= 140 MM HG: CPT | Performed by: FAMILY MEDICINE

## 2024-08-05 PROCEDURE — 3078F DIAST BP <80 MM HG: CPT | Performed by: FAMILY MEDICINE

## 2024-08-05 RX ORDER — ESTRADIOL 0.5 MG/1
0.5 TABLET ORAL
COMMUNITY
Start: 2024-07-31

## 2024-08-05 RX ORDER — AMLODIPINE BESYLATE 5 MG/1
5 TABLET ORAL DAILY
Qty: 90 TABLET | Refills: 1 | Status: SHIPPED | OUTPATIENT
Start: 2024-08-05 | End: 2025-02-01

## 2024-08-05 ASSESSMENT — ENCOUNTER SYMPTOMS
HEADACHES: 0
SHORTNESS OF BREATH: 0
BLURRED VISION: 0
NECK PAIN: 0
ORTHOPNEA: 0
PND: 0
HYPERTENSION: 1
PALPITATIONS: 0
SWEATS: 0

## 2024-08-05 NOTE — PROGRESS NOTES
Covid vax: x 3  Flu: UTD  Shingles: advised    CRC: due 2025  Mammogram: 2/2024  Pap: 1/2023  Lmp: IUD

## 2024-08-05 NOTE — PROGRESS NOTES
"Subjective   Patient ID: Monet Aguilar is a 54 y.o. female who presents for Hypertension.  Covid vax: x 3  Flu: UTD  Shingles: advised     CRC: due 2025  Mammogram: 2/2024  Pap: 1/2023  Lmp: IUD   Sees ccf had pap  1st degree rectocele cystocele  Is in pelvic floor therapy    HPI  Patient Active Problem List   Diagnosis    Abnormal mammogram    Bilateral sensorineural hearing loss    Chronic constipation    Diverticulitis of colon    Diverticulosis of intestine    Hypertrophic scar    Keloid cicatrix    Patellofemoral pain syndrome of right knee    Sleep difficulties    Stomach ulcer    Benign essential hypertension    Anxiety and depression       Past Surgical History:   Procedure Laterality Date    BREAST SURGERY  2018    augmentation    COLONOSCOPY W/ POLYPECTOMY  06/2020    adenoma-due 2025    OTHER SURGICAL HISTORY  2018    abdominoplasty    WISDOM TOOTH EXTRACTION  1988       Review of Systems  This patient has  NO history of seizures/ CAD or CVA    NO history of recent Covid nor flu symptoms,  NO Fever nor chills,  NO Chest pain, shortness of breath nor paroxysmal nocturnal dyspnea,  NO Nausea, vomiting, nor diarrhea,  NO Hematochezia nor melena,  NO Dysuria, hematuria, nor new incontinence issues  NO new severe headaches nor neurological complaints,  NO new issues with anxiety nor depression nor new psychiatric complaints,  NO suicidal nor homicidal ideations.     OBJECTIVE:  /82   Pulse 70   Temp 36.2 °C (97.1 °F) (Temporal)   Resp 16   Ht 1.575 m (5' 2\")   Wt 69.4 kg (153 lb)   SpO2 98%   BMI 27.98 kg/m²      General:  alert, oriented, no acute distress.  No obvious skin rashes noted.   No gait disturbance noted.    Mood is pleasant,  no signs of emotional distress.   Not appearing intoxicated or altered.   No voiced delusions,   Normal, appropriate behavior.    HEENT: Normocephalic, atraumatic,   Pupils round, reactive to light  Extraocular motions intact and wnl  Tympanic membranes " normal    Neck: no nuchal rigidity  No masses palpable.  No carotid bruits.  No thyromegaly.    Respiratory: Equal breath sounds  No wheezes,    rales,    nor rhonchi  No respiratory distress.    Heart: Regular rate and rhythm, no    murmurs  no rubs/gallops    Abdomen: no masses palpable, nontender, no rebound nor guarding.    Extremities: NO cyanosis noted, no clubbing.   No edema noted.  2+dorsalis pedis pulses.    Normal-not antalgic, steady gait.    No visits with results within 3 Month(s) from this visit.   Latest known visit with results is:   [facility] Encounter on 01/06/2023   Component Date Value Ref Range Status    Pathology Report 01/06/2023    Final                    Value:    Accession #:      Date of Procedure:  1/6/2023       Pathologist: [facility], Cytology  Date Reported: 1/16/2023  Date Received:  1/6/2023  Submitting Physician: LETY HERMOSILLO M.D.                    FINAL CYTOLOGICAL INTERPRETATION        A.  THINPREP PAP CERVICAL:              Specimen Adequacy:       SATISFACTORY FOR EVALUATION.       Quality Indicator: Absence of endocervical/transformation zone component.              General Categorization:       NEGATIVE FOR INTRAEPITHELIAL LESION OR MALIGNANCY.              Descriptive Interpretation:       SHIFT IN VAGINAL ROSANNE SUGGESTIVE OF BACTERIAL VAGINOSIS.                            HIGH RISK HPV TEST RESULT:                       HPV GENOTYPE  16                      NEGATIVE       HPV GENOTYPE  18                      NEGATIVE       HPV GENOTYPE  OTHER             NEGATIVE              Reference Range: Negative                  Slide(s) initially screened by a Cytotechnologist at [facility], [address] [address]  Testing for high-risk (HR) type of human papilloma virus (HPV) is performed by  the Roche lorna HPV Test.  The lorna HPV Test is a qualitative  polymerase chain  reaction that amplifies DNA of HPV16, HPV18 and 12 other high-risk HPV types  (31, 33, 35, 39, 45, 51, 52, 56, 58, 59, 66, and 68) associated with cervical  cancer and its precursor lesions.  A positive result indicates the presence of  HPV DNA due to one or more of the 14 genotypes: 16, 18, 31, 33, 35, 39, 45, 51,  52, 56, 58, 59, 66, and 68. Negative results indicate HPV DNA concentrations  are undetectable or below the pre-set threshold for detection. False negative  results may be associated with unoptimized sampling. A negative HR HPV result  does not exclude the possibility of future cytologic HSIL or underlying CIN2-3  or cancer.    This test is approved for cervical specimens by  the US Food and Drug  Administration. Results of this test s                          hould be interpreted in conjunction with  the patient's Pap test results.  Please refer to ASCCP current guidelines for  the use of HPV DNA testing, result interpretation, and patient management.   The performance of this test was verified by the Molecular Diagnostic  Laboratory at [facility]. The lab is  certified under the Clinical Laboratory Amendments of 1988 (CLIA 88) as  qualified to perform high complexity clinical laboratory testing.    This specimen has been analyzed by the [employer] Imaging System ([employer]  an automated imaging and review system, which assists the laboratory in  evaluating cells on ThinPrep Pap tests. Following automated imaging, selected  fields from every slide were reviewed by a cytotechnologist and/or pathologist.    Electronically Signed Out By [facility], Cytology//MXG   By the signature on this report, the individual or group listed as making the  Final Interpretation/Diagnosis certifies                           that they have reviewed this case.  Diagnostic interpretation performed at [facility] [address] [place] [address]  Educational Note:  Cervical cytology is a screening procedure primarily for squamous cancers and  precursors and has associated false-negative and false-positive results as  evidenced by published data.  Your patient's test should be interpreted in this  context, together with patient's history and clinical findings.  Regular  sampling and follow-up of unexplained clinical signs and symptoms are  recommended to minimize false negative results.         Clinical History  Date of Last Menstrual Period:     AMENORRHEA WITH IUD    Contraceptive History:  IUD  Other Clinical Conditions:  HPV Test for All Interpretations - Include HPV Genotype    Clinical Diagnosis History: Cervical cancer screening - (Z12.4)   Source of Specimen  A: THINPREP PAP CERVICAL            [facility]  Department of Pathology   [id number] [place], [place] [id number]        CONVERTED FINAL DIAGNOSIS 01/06/2023    Final                    Value:A.  THINPREP PAP CERVICAL:              Specimen Adequacy:       SATISFACTORY FOR EVALUATION.       Quality Indicator: Absence of endocervical/transformation zone component.              General Categorization:       NEGATIVE FOR INTRAEPITHELIAL LESION OR MALIGNANCY.              Descriptive Interpretation:       SHIFT IN VAGINAL ROSANNE SUGGESTIVE OF BACTERIAL VAGINOSIS.                            HIGH RISK HPV TEST RESULT:                       HPV GENOTYPE  16                      NEGATIVE       HPV GENOTYPE  18                      NEGATIVE       HPV GENOTYPE  OTHER             NEGATIVE              Reference Range: Negative                    CONVERTED CLINICAL DIAGNOSIS-HISTO* 01/06/2023 Clinical Diagnosis History: Cervical cancer screening - (Z12.4)   Final    CONVERTED DIAGNOSIS COMMENT 01/06/2023    Final                    Value:Slide(s) initially screened by a Cytotechnologist at [facility]  [facility], [address] [address]  Testing for high-risk (HR) type of human papilloma virus (HPV) is performed by  the Roche lorna HPV Test.  The lorna HPV Test is a qualitative polymerase chain  reaction that amplifies DNA of HPV16, HPV18 and 12 other high-risk HPV types  (31, 33, 35, 39, 45, 51, 52, 56, 58, 59, 66, and 68) associated with cervical  cancer and its precursor lesions.  A positive result indicates the presence of  HPV DNA due to one or more of the 14 genotypes: 16, 18, 31, 33, 35, 39, 45, 51,  52, 56, 58, 59, 66, and 68. Negative results indicate HPV DNA concentrations  are undetectable or below the pre-set threshold for detection. False negative  results may be associated with unoptimized sampling. A negative HR HPV result  does not exclude the possibility of future cytologic HSIL or underlying CIN2-3  or cancer.    This test is approved for cervical specimens                           by  the US Food and Drug  Administration. Results of this test should be interpreted in conjunction with  the patient's Pap test results.  Please refer to ASCCP current guidelines for  the use of HPV DNA testing, result interpretation, and patient management.   The performance of this test was verified by the Molecular Diagnostic  Laboratory at [facility]. The lab is  certified under the Clinical Laboratory Amendments of 1988 (CLIA 88) as  qualified to perform high complexity clinical laboratory testing.    This specimen has been analyzed by the [employer] Imaging System ([employer]  an automated imaging and review system, which assists the laboratory in  evaluating cells on ThinPrep Pap tests. Following automated imaging, selected  fields from every slide were reviewed by a cytotechnologist and/or pathologist.      CONVERTED FINAL REPORT PDF LINK TO* 01/06/2023 \\copathshare\copath\PDF 2022_[id number]   Final        Assessment/Plan     Problem  List Items Addressed This Visit       Benign essential hypertension    Anxiety and depression     Other Visit Diagnoses       Right sciatic nerve pain    -  Primary            Follow up at next scheduled visit -as planned  Mood is stable  Signs-sxs necessitating er or NS d/w pt  Up norvasc This medications risks, benefits, and alternatives were discussed with patient at length.  If any unwanted side effects occur-discontinue medicine and call the office for discussion.  Diet and exercise changes   See me 6mo  Take bps at home

## 2024-08-13 ENCOUNTER — TELEPHONE (OUTPATIENT)
Dept: PRIMARY CARE | Facility: CLINIC | Age: 54
End: 2024-08-13
Payer: COMMERCIAL

## 2024-08-13 NOTE — TELEPHONE ENCOUNTER
Swathi from [facility] and [facility] wanted to let you know that the pt came into their office on 7/29/24 for a pelvic floor evaluation. She is now under the care of Luann Higginbotham for pelvic floor physical therapy.

## 2024-11-07 ENCOUNTER — E-VISIT (OUTPATIENT)
Dept: PRIMARY CARE | Facility: CLINIC | Age: 54
End: 2024-11-07
Payer: COMMERCIAL

## 2024-11-07 DIAGNOSIS — R63.5 WEIGHT GAIN: Primary | ICD-10-CM

## 2024-11-11 DIAGNOSIS — F32.A ANXIETY AND DEPRESSION: ICD-10-CM

## 2024-11-11 DIAGNOSIS — F41.9 ANXIETY AND DEPRESSION: ICD-10-CM

## 2024-11-11 RX ORDER — SERTRALINE HYDROCHLORIDE 25 MG/1
25 TABLET, FILM COATED ORAL DAILY
Qty: 30 TABLET | Refills: 0 | Status: SHIPPED | OUTPATIENT
Start: 2024-11-11

## 2024-11-11 RX ORDER — SEMAGLUTIDE 0.25 MG/.5ML
0.25 INJECTION, SOLUTION SUBCUTANEOUS WEEKLY
Qty: 2 ML | Refills: 0 | Status: SHIPPED | OUTPATIENT
Start: 2024-11-11 | End: 2024-11-13 | Stop reason: SDUPTHER

## 2024-11-11 NOTE — TELEPHONE ENCOUNTER
HAL najera  I will send a message to my pharmacist  Hien  She will contact you for semaglutide-compounded  Makes it a bit cheaper  I sent to pharmacy-DONT get that one-will likely be expensive  Using for wt loss  Need labs every 6months on it    The purpose of an E VISIT is a convenient, affordable way to receive a treatment plan for mild to moderate illnesses or problems. An E VISIT is limited in its ability to diagnose and treat and is not as thorough nor helpful as an in person visit and examination. It must be understood that nothing replaces EMERGENCY ROOM CARE /Calling 911 for emergencies or any pain of severe nature-not limited to but certainly including chest, abdominal, or headache pain and any bleeding that cannot be easily stopped, dizziness/ light headedness and many other urgent symptoms NOT mentioned some include symptoms of heart attack or stroke.     We are doing an E VISIT to help YOU as a convenience to get tests ordered or treatments started-saving you time. Especially since multiple messages are sent each day to our office and some medical issues can be streamlined or investigations and/ or treatment started by Email methods. However, many are NOT conducive to this email type communication.  Also-we may initially select an email visit as appropriate but unwanted side effects of medicine or new concerns may arise-in which case -another E VISIT is INAPPROPRIATE and should result in an in-person visit.  Thank you.    Dr teixeira

## 2024-11-13 ENCOUNTER — TELEMEDICINE (OUTPATIENT)
Dept: PHARMACY | Facility: HOSPITAL | Age: 54
End: 2024-11-13
Payer: COMMERCIAL

## 2024-11-13 RX ORDER — SEMAGLUTIDE 0.25 MG/.5ML
0.25 INJECTION, SOLUTION SUBCUTANEOUS WEEKLY
Qty: 2 ML | Refills: 0 | Status: SHIPPED | OUTPATIENT
Start: 2024-11-13 | End: 2024-12-05

## 2024-11-13 NOTE — PROGRESS NOTES
Clinical Pharmacy Appointment    Patient ID: Monet Aguilar is a 54 y.o. female who presents for Weight Loss.    Pt is here for First appointment.     Referring Provider: Sherlyn Malave, *  PCP: Sherlyn Malave MD   Last visit with PCP: 11.11.2024   Next visit with PCP: 2.5.2025    Subjective   HPI  WEIGHT LOSS  BMI Readings from Last 3 Encounters:   08/05/24 27.98 kg/m²   01/10/24 28.35 kg/m²   01/06/23 27.07 kg/m²      Starting weight: 153 lbs. (8.5.2024)  Current weight: Did not review today    Lifestyle  Diet: vegetarian; does endorse in seafood occasionally  Physical Activity: Patient confirms active lifestyle    Non-Pharmacological Therapy  Weight loss techniques attempted:  Self-directed dieting: patient vegetarian, does endorse in some dairy products/occasional seafood    Pharmacological Therapy  Current Medications: N/a  Adverse Effects: N/a    Insurance coverage of weight-loss medications? No, patient does not have prescription coverage    Drug Interactions  No relevant drug interactions were noted.    Objective   No Known Allergies  Social History     Social History Narrative    Not on file      Medication Review  Current Outpatient Medications   Medication Instructions    amLODIPine (NORVASC) 5 mg, oral, Daily    busPIRone (BUSPAR) 10 mg, oral, 3 times daily PRN    estradiol (ESTRACE) 0.5 mg, oral, Daily RT    levonorgestrel (Mirena) 21 mcg/24 hours (8 yrs) 52 mg IUD 1 each, intrauterine, Once    multivitamin tablet 1 tablet, oral, Daily    sertraline (ZOLOFT) 25 mg, oral, Daily    Wegovy 0.25 mg, subcutaneous, Weekly      Vitals  BP Readings from Last 2 Encounters:   08/05/24 148/78   01/10/24 138/86     BMI Readings from Last 1 Encounters:   08/05/24 27.98 kg/m²      Labs  A1C  Lab Results   Component Value Date    HGBA1C 5.4 02/01/2024    HGBA1C 5.5 01/18/2023    HGBA1C 5.6 01/27/2022     BMP  Lab Results   Component Value Date    CALCIUM 9.4 01/27/2022     01/27/2022    K 4.0  "01/27/2022    CO2 28 01/27/2022     01/27/2022    BUN 19 01/27/2022    CREATININE 0.48 (L) 01/27/2022     LFTs  Lab Results   Component Value Date    ALT 10 01/27/2022    AST 11 01/27/2022    ALKPHOS 50 01/27/2022    BILITOT 0.6 01/27/2022     FLP  Lab Results   Component Value Date    TRIG 60 01/27/2022    CHOL 174 01/27/2022    LDLF 107 (H) 01/27/2022    HDL 55.0 01/27/2022     Urine Microalbumin  No results found for: \"MICROALBCREA\"  Weight Management  Wt Readings from Last 3 Encounters:   08/05/24 69.4 kg (153 lb)   01/10/24 70.3 kg (155 lb)   01/06/23 67.1 kg (148 lb)      There is no height or weight on file to calculate BMI.     Assessment/Plan   Problem List Items Addressed This Visit       BMI 27.0-27.9,adult     Current regimen includes N/a. Patient's current weight reported as 153 pounds in August 2024. Due to BMI above goal, will plan to start semaglutide today.    Spoke with patient today regarding [facility] pharmacy for weight loss medications. Explained to patient that these are not FDA approved and are not given at approved dosing, therefore it cannot be guaranteed that the medication will have the same effectiveness and safety that prescription Ozempic/Wegovy/Mounjaro/Zepbound do. Patient agreeable to proceed with semaglutide. Counseled patient on MOA, expectations, side effects, duration of therapy, contraindications, administration, and monitoring parameters. Reviewed no history of MTC nor pancreatitis. Answered all patient questions and concerns; provided [facility] phone number if issues/questions arise.     Medication Changes:  START  Semaglutide 0.3 mg subcutaneously once weekly          Clinical Pharmacist follow-up: 12/4/2024 @ 8:30 am, Telehealth visit    Continue all meds under the continuation of care with the referring provider and clinical pharmacy team.    Thank you,  Hien Cash, PharmD  Clinical Pharmacist  [phone]    Verbal consent to manage patient's drug therapy was " obtained from the patient. They were informed they may decline to participate or withdraw from participation in pharmacy services at any time.

## 2024-11-13 NOTE — ASSESSMENT & PLAN NOTE
Current regimen includes N/a. Patient's current weight reported as 153 pounds in August 2024. Due to BMI above goal, will plan to start semaglutide today.    Spoke with patient today regarding [facility] pharmacy for weight loss medications. Explained to patient that these are not FDA approved and are not given at approved dosing, therefore it cannot be guaranteed that the medication will have the same effectiveness and safety that prescription Ozempic/Wegovy/Mounjaro/Zepbound do. Patient agreeable to proceed with semaglutide. Counseled patient on MOA, expectations, side effects, duration of therapy, contraindications, administration, and monitoring parameters. Reviewed no history of MTC nor pancreatitis. Answered all patient questions and concerns; provided [facility] phone number if issues/questions arise.     Medication Changes:  START  Semaglutide 0.3 mg subcutaneously once weekly

## 2024-11-25 ENCOUNTER — PATIENT MESSAGE (OUTPATIENT)
Dept: PRIMARY CARE | Facility: CLINIC | Age: 54
End: 2024-11-25
Payer: COMMERCIAL

## 2024-11-25 DIAGNOSIS — I10 BENIGN ESSENTIAL HYPERTENSION: ICD-10-CM

## 2024-11-25 DIAGNOSIS — I10 ESSENTIAL HYPERTENSION: ICD-10-CM

## 2024-12-02 RX ORDER — AMLODIPINE BESYLATE 5 MG/1
5 TABLET ORAL 2 TIMES DAILY
Qty: 180 TABLET | Refills: 1 | Status: SHIPPED | OUTPATIENT
Start: 2024-12-02

## 2024-12-04 ENCOUNTER — APPOINTMENT (OUTPATIENT)
Dept: PHARMACY | Facility: HOSPITAL | Age: 54
End: 2024-12-04
Payer: COMMERCIAL

## 2024-12-04 RX ORDER — SEMAGLUTIDE 0.25 MG/.5ML
0.25 INJECTION, SOLUTION SUBCUTANEOUS WEEKLY
Qty: 2 ML | Refills: 0 | Status: SHIPPED | OUTPATIENT
Start: 2024-12-04 | End: 2024-12-26

## 2024-12-04 NOTE — ASSESSMENT & PLAN NOTE
Current regimen includes semaglutide 0.3 mg weekly. Patient's current weight reported as 150.4 pounds. Has lost 3-5 lbs since starting therapy plan. Due to patient currently on starter dose, will plan to increase therapy today to maintenance dose.    Medication Changes:  INCREASE  Semaglutide 0.6 mg subcutaneously once weekly. Prescription sent to [facility] pharmacy.    Spoke with patient today regarding [facility] pharmacy for weight loss medications. Explained to patient that these are not FDA approved and are not given at approved dosing, therefore it cannot be guaranteed that the medication will have the same effectiveness and safety that prescription Ozempic/Wegovy/Mounjaro/Zepbound do. Patient agreeable to proceed with semaglutide. Counseled patient on MOA, expectations, side effects, duration of therapy, contraindications, administration, and monitoring parameters. Reviewed no history of MTC nor pancreatitis. Answered all patient questions and concerns; provided [facility] phone number if issues/questions arise.

## 2024-12-04 NOTE — PROGRESS NOTES
"  Clinical Pharmacy Appointment    Patient ID: Monet Aguilar is a 54 y.o. female who presents for Weight Loss.    Pt is here for Follow Up appointment.     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    HPI  WEIGHT LOSS  BMI Readings from Last 3 Encounters:   08/05/24 27.98 kg/m²   01/10/24 28.35 kg/m²   01/06/23 27.07 kg/m²      Starting weight: 153 lbs. (8.5.2024)  Current weight: 150.4 lbs (12.4.2024)    Lifestyle  Diet: vegetarian; does endorse in seafood occasionally  Physical Activity: Patient confirms active lifestyle; nurse    Non-Pharmacological Therapy  Weight loss techniques attempted:  Self-directed dieting: patient vegetarian, does endorse in some dairy products/occasional seafood    Pharmacological Therapy  Current Medications: semaglutide 0.3 mg subcutaneously once weekly  Adverse Effects: mild nausea    Insurance coverage of weight-loss medications? No, patient does not have prescription coverage    Drug Interactions  No relevant drug interactions were noted.    Objective   No Known Allergies  Social History     Social History Narrative    Not on file      Medication Review  Current Outpatient Medications   Medication Instructions    amLODIPine (NORVASC) 5 mg, oral, 2 times daily    busPIRone (BUSPAR) 10 mg, oral, 3 times daily PRN    estradiol (ESTRACE) 0.5 mg, oral, Daily RT    levonorgestrel (Mirena) 21 mcg/24 hours (8 yrs) 52 mg IUD 1 each, intrauterine, Once    multivitamin tablet 1 tablet, oral, Daily    " "sertraline (ZOLOFT) 25 mg, oral, Daily    Wegovy 0.25 mg, subcutaneous, Weekly, Inject 0.3 mg subcutaneously once weekly. Qty: 4 pens. 0 refills      Vitals  BP Readings from Last 2 Encounters:   08/05/24 148/78   01/10/24 138/86     BMI Readings from Last 1 Encounters:   08/05/24 27.98 kg/m²      Labs  A1C  Lab Results   Component Value Date    HGBA1C 5.4 02/01/2024    HGBA1C 5.5 01/18/2023    HGBA1C 5.6 01/27/2022     BMP  Lab Results   Component Value Date    CALCIUM 9.4 01/27/2022     01/27/2022    K 4.0 01/27/2022    CO2 28 01/27/2022     01/27/2022    BUN 19 01/27/2022    CREATININE 0.48 (L) 01/27/2022     LFTs  Lab Results   Component Value Date    ALT 10 01/27/2022    AST 11 01/27/2022    ALKPHOS 50 01/27/2022    BILITOT 0.6 01/27/2022     FLP  Lab Results   Component Value Date    TRIG 60 01/27/2022    CHOL 174 01/27/2022    LDLF 107 (H) 01/27/2022    HDL 55.0 01/27/2022     Urine Microalbumin  No results found for: \"MICROALBCREA\"  Weight Management  Wt Readings from Last 3 Encounters:   08/05/24 69.4 kg (153 lb)   01/10/24 70.3 kg (155 lb)   01/06/23 67.1 kg (148 lb)      There is no height or weight on file to calculate BMI.     Assessment/Plan   Problem List Items Addressed This Visit       BMI 27.0-27.9,adult     Current regimen includes semaglutide 0.3 mg weekly. Patient's current weight reported as 150.4 pounds. Has lost 3-5 lbs since starting therapy plan. Due to patient currently on starter dose, will plan to increase therapy today to maintenance dose.    Medication Changes:  INCREASE  Semaglutide 0.6 mg subcutaneously once weekly. Prescription sent to [facility] pharmacy.    Spoke with patient today regarding [facility] pharmacy for weight loss medications. Explained to patient that these are not FDA approved and are not given at approved dosing, therefore it cannot be guaranteed that the medication will have the same effectiveness and safety that prescription " Ozempic/Wegovy/Mounjaro/Zepbound do. Patient agreeable to proceed with semaglutide. Counseled patient on MOA, expectations, side effects, duration of therapy, contraindications, administration, and monitoring parameters. Reviewed no history of MTC nor pancreatitis. Answered all patient questions and concerns; provided [facility] phone number if issues/questions arise.            Clinical Pharmacist follow-up: 1/2/2025 @ 8:40 am, Telehealth visit    Continue all meds under the continuation of care with the referring provider and clinical pharmacy team.    Thank you,  Hien Cash, PharmD  Clinical Pharmacist  [phone]    Verbal consent to manage patient's drug therapy was obtained from the patient. They were informed they may decline to participate or withdraw from participation in pharmacy services at any time.

## 2024-12-07 DIAGNOSIS — F41.9 ANXIETY AND DEPRESSION: ICD-10-CM

## 2024-12-07 DIAGNOSIS — F32.A ANXIETY AND DEPRESSION: ICD-10-CM

## 2024-12-07 RX ORDER — SERTRALINE HYDROCHLORIDE 25 MG/1
25 TABLET, FILM COATED ORAL DAILY
Qty: 90 TABLET | Refills: 1 | Status: SHIPPED | OUTPATIENT
Start: 2024-12-07

## 2025-01-02 ENCOUNTER — APPOINTMENT (OUTPATIENT)
Dept: PHARMACY | Facility: HOSPITAL | Age: 55
End: 2025-01-02
Payer: COMMERCIAL

## 2025-01-02 RX ORDER — SEMAGLUTIDE 0.25 MG/.5ML
0.25 INJECTION, SOLUTION SUBCUTANEOUS WEEKLY
Qty: 2 ML | Refills: 3 | Status: SHIPPED | OUTPATIENT
Start: 2025-01-02 | End: 2025-04-18

## 2025-01-02 RX ORDER — SEMAGLUTIDE 0.25 MG/.5ML
0.25 INJECTION, SOLUTION SUBCUTANEOUS WEEKLY
Qty: 2 ML | Refills: 0 | Status: SHIPPED | OUTPATIENT
Start: 2025-01-02 | End: 2025-01-24

## 2025-01-02 NOTE — PROGRESS NOTES
"  Clinical Pharmacy Appointment    Patient ID: Monet Aguilar is a 54 y.o. female who presents for Weight Loss.    Pt is here for Follow Up appointment.     Referring Provider: Sherlyn Malave, *  PCP: Sherlyn Malave MD   Last visit with PCP: 11.11.2024   Next visit with PCP: 2.5.2025    Subjective   Interval History:  Increased semaglutide to 0.6 mg  SE: Feels \"off\", sour stomach  Due for second dose of 0.6 mg today  Significant appetite suppression  Reports medication wears off toward end of the week  Has lost about 10 pounds since before holidays  Has not checked weight today  BP - needs to begin checking again  Last time checked about two weeks ago and was 120/78    HPI  WEIGHT LOSS  BMI Readings from Last 3 Encounters:   08/05/24 27.98 kg/m²   01/10/24 28.35 kg/m²   01/06/23 27.07 kg/m²      Starting weight: 153 lbs. (8.5.2024)  - 150.4 lbs (12.4.2024)  Current weight: ~143 pounds (1.2.2025)    Lifestyle  Diet: vegetarian; does endorse in seafood occasionally  Physical Activity: Patient confirms active lifestyle; nurse    Non-Pharmacological Therapy  Weight loss techniques attempted:  Self-directed dieting: patient vegetarian, does endorse in some dairy products/occasional seafood    Pharmacological Therapy  Current Medications: semaglutide 0.6 mg subcutaneously once weekly    Insurance coverage of weight-loss medications? No, patient does not have prescription coverage    Drug Interactions  No relevant drug interactions were noted.    Objective   No Known Allergies  Social History     Social History Narrative    Not on file      Medication Review  Current Outpatient Medications   Medication Instructions    amLODIPine (NORVASC) 5 mg, oral, 2 times daily    busPIRone (BUSPAR) 10 mg, oral, 3 times daily PRN    estradiol (ESTRACE) 0.5 mg, oral, Daily RT    levonorgestrel (Mirena) 21 mcg/24 hours (8 yrs) 52 mg IUD 1 each, intrauterine, Once    multivitamin tablet 1 tablet, oral, Daily    sertraline " "(ZOLOFT) 25 mg, oral, Daily    Wegovy 0.25 mg, subcutaneous, Weekly, Inject 0.6 mg subcutaneously once weekly. Qty: 4 pens. 0 refills      Vitals  BP Readings from Last 2 Encounters:   08/05/24 148/78   01/10/24 138/86     BMI Readings from Last 1 Encounters:   08/05/24 27.98 kg/m²      Labs  A1C  Lab Results   Component Value Date    HGBA1C 5.4 02/01/2024    HGBA1C 5.5 01/18/2023    HGBA1C 5.6 01/27/2022     BMP  Lab Results   Component Value Date    CALCIUM 9.4 01/27/2022     01/27/2022    K 4.0 01/27/2022    CO2 28 01/27/2022     01/27/2022    BUN 19 01/27/2022    CREATININE 0.48 (L) 01/27/2022     LFTs  Lab Results   Component Value Date    ALT 10 01/27/2022    AST 11 01/27/2022    ALKPHOS 50 01/27/2022    BILITOT 0.6 01/27/2022     FLP  Lab Results   Component Value Date    TRIG 60 01/27/2022    CHOL 174 01/27/2022    LDLF 107 (H) 01/27/2022    HDL 55.0 01/27/2022     Urine Microalbumin  No results found for: \"MICROALBCREA\"  Weight Management  Wt Readings from Last 3 Encounters:   08/05/24 69.4 kg (153 lb)   01/10/24 70.3 kg (155 lb)   01/06/23 67.1 kg (148 lb)      There is no height or weight on file to calculate BMI.     Assessment/Plan   Problem List Items Addressed This Visit       BMI 27.0-27.9,adult     Current regimen includes semaglutide 0.6 mg weekly. Patient's current weight reported as ~143 pounds. Has lost 10 lbs since starting therapy plan. Given patient experiencing side effects, encouraged patient to continue 0.6 mg for at least 3-4 weeks. Advised side effects should begin to improve, however will send both 0.3 mg and 0.6 mg to [facility] pharmacy, and patient advised can self-decrease dose if unable to tolerate. Patient will follow-up with PCP at beginning of Feb. and [facility] will follow-up with patient the following week.    Medication Changes:  CONTINUE  Semaglutide 0.6 mg subcutaneously once weekly. Prescription sent to [facility] pharmacy.    Spoke with patient today regarding [facility] for weight loss medications. Explained to patient that these are not FDA approved and are not given at approved dosing, therefore it cannot be guaranteed that the medication will have the same effectiveness and safety that prescription Ozempic/Wegovy/Mounjaro/Zepbound do. Patient agreeable to proceed with semaglutide. Counseled patient on MOA, expectations, side effects, duration of therapy, contraindications, administration, and monitoring parameters. Reviewed no history of MTC nor pancreatitis. Answered all patient questions and concerns; provided [facility] phone number if issues/questions arise.          Other Visit Diagnoses       BMI 28.0-28.9,adult              Clinical Pharmacist follow-up: 2/13/2025 @ 9 am, Telehealth visit    Continue all meds under the continuation of care with the referring provider and clinical pharmacy team.    Thank you,  Hien Cash, PharmD  Clinical Pharmacist  [phone]    Verbal consent to manage patient's drug therapy was obtained from the patient. They were informed they may decline to participate or withdraw from participation in pharmacy services at any time.

## 2025-01-02 NOTE — ASSESSMENT & PLAN NOTE
Current regimen includes semaglutide 0.6 mg weekly. Patient's current weight reported as ~143 pounds. Has lost 10 lbs since starting therapy plan. Given patient experiencing side effects, encouraged patient to continue 0.6 mg for at least 3-4 weeks. Advised side effects should begin to improve, however will send both 0.3 mg and 0.6 mg to [facility] pharmacy, and patient advised can self-decrease dose if unable to tolerate. Patient will follow-up with PCP at beginning of Feb. and [facility] will follow-up with patient the following week.    Medication Changes:  CONTINUE  Semaglutide 0.6 mg subcutaneously once weekly. Prescription sent to [facility] pharmacy.    Spoke with patient today regarding [facility] pharmacy for weight loss medications. Explained to patient that these are not FDA approved and are not given at approved dosing, therefore it cannot be guaranteed that the medication will have the same effectiveness and safety that prescription Ozempic/Wegovy/Mounjaro/Zepbound do. Patient agreeable to proceed with semaglutide. Counseled patient on MOA, expectations, side effects, duration of therapy, contraindications, administration, and monitoring parameters. Reviewed no history of MTC nor pancreatitis. Answered all patient questions and concerns; provided [facility] phone number if issues/questions arise.

## 2025-02-05 ENCOUNTER — HOSPITAL ENCOUNTER (OUTPATIENT)
Dept: RADIOLOGY | Facility: HOSPITAL | Age: 55
Discharge: HOME | End: 2025-02-05
Payer: COMMERCIAL

## 2025-02-05 ENCOUNTER — APPOINTMENT (OUTPATIENT)
Dept: PRIMARY CARE | Facility: CLINIC | Age: 55
End: 2025-02-05
Payer: COMMERCIAL

## 2025-02-05 VITALS
WEIGHT: 138 LBS | HEIGHT: 62 IN | OXYGEN SATURATION: 98 % | TEMPERATURE: 97.5 F | BODY MASS INDEX: 25.4 KG/M2 | RESPIRATION RATE: 16 BRPM | HEART RATE: 62 BPM | SYSTOLIC BLOOD PRESSURE: 146 MMHG | DIASTOLIC BLOOD PRESSURE: 82 MMHG

## 2025-02-05 DIAGNOSIS — F32.A ANXIETY AND DEPRESSION: ICD-10-CM

## 2025-02-05 DIAGNOSIS — M25.522 LEFT ELBOW PAIN: ICD-10-CM

## 2025-02-05 DIAGNOSIS — F41.9 ANXIETY AND DEPRESSION: ICD-10-CM

## 2025-02-05 DIAGNOSIS — I10 BENIGN ESSENTIAL HYPERTENSION: ICD-10-CM

## 2025-02-05 DIAGNOSIS — M25.522 LEFT ELBOW PAIN: Primary | ICD-10-CM

## 2025-02-05 DIAGNOSIS — Z12.31 ENCOUNTER FOR SCREENING MAMMOGRAM FOR BREAST CANCER: ICD-10-CM

## 2025-02-05 DIAGNOSIS — Z12.11 COLON CANCER SCREENING: ICD-10-CM

## 2025-02-05 PROCEDURE — 73080 X-RAY EXAM OF ELBOW: CPT | Mod: LT

## 2025-02-05 PROCEDURE — 3077F SYST BP >= 140 MM HG: CPT | Performed by: FAMILY MEDICINE

## 2025-02-05 PROCEDURE — 3079F DIAST BP 80-89 MM HG: CPT | Performed by: FAMILY MEDICINE

## 2025-02-05 PROCEDURE — 99214 OFFICE O/P EST MOD 30 MIN: CPT | Performed by: FAMILY MEDICINE

## 2025-02-05 PROCEDURE — 3008F BODY MASS INDEX DOCD: CPT | Performed by: FAMILY MEDICINE

## 2025-02-05 PROCEDURE — 1036F TOBACCO NON-USER: CPT | Performed by: FAMILY MEDICINE

## 2025-02-05 NOTE — PROGRESS NOTES
"Subjective   Patient ID: Monet Aguilar is a 54 y.o. female who presents for FOLLOW UP VISIT.  Covid vax: x 3  Flu: UTD  Shingles: advised     CRC: due 2025-ordered  Mammogram: 2/2024-ordered  Pap: 1/2023  Lmp: IUD  Left elbow pain-thought perhaps hit elbow on door  Has sleeve    HPI  Patient Active Problem List   Diagnosis    Abnormal mammogram    Bilateral sensorineural hearing loss    Chronic constipation    Diverticulitis of colon    Diverticulosis of intestine    Hypertrophic scar    Keloid cicatrix    Patellofemoral pain syndrome of right knee    Sleep difficulties    Stomach ulcer    Benign essential hypertension    Anxiety and depression    BMI 27.0-27.9,adult       Past Surgical History:   Procedure Laterality Date    BREAST SURGERY  2018    augmentation    COLONOSCOPY W/ POLYPECTOMY  06/2020    adenoma-due 2025    OTHER SURGICAL HISTORY  2018    abdominoplasty    WISDOM TOOTH EXTRACTION  1988       Review of Systems  This patient has  NO history of seizures/ CAD or CVA    NO history of recent Covid nor flu symptoms,    NO Fever nor chills today,    NO Chest pain, shortness of breath nor paroxysmal nocturnal dyspnea,  NO Nausea, vomiting, nor diarrhea,  NO Hematochezia nor melena,  NO Dysuria, hematuria, nor new incontinence issues  NO new severe headaches nor neurological complaints,  NO new issues with anxiety nor depression nor new psychiatric complaints,  NO suicidal nor homicidal ideations.     OBJECTIVE:  /82   Pulse 62   Temp 36.4 °C (97.5 °F) (Temporal)   Resp 16   Ht 1.575 m (5' 2\")   Wt 62.6 kg (138 lb)   SpO2 98%   BMI 25.24 kg/m²      General:  alert, oriented, no acute distress.  No obvious skin rashes noted.   No gait disturbance noted/baseline gait.    Mood is pleasant,  no signs of emotional distress.   Not appearing intoxicated or altered.   No voiced delusions,   Normal, appropriate behavior.    HEENT: Normocephalic, atraumatic,   Pupils round, reactive to light  Extraocular " motions intact and wnl  Tympanic membranes normal    Neck: no nuchal rigidity  No masses palpable.  No carotid bruits.  No thyromegaly.    Respiratory: Equal breath sounds  No wheezes,    rales,    nor rhonchi  No respiratory distress.    Heart: Regular rate and rhythm, no    murmurs  no rubs/gallops    Abdomen: no masses palpable, nontender, no rebound nor guarding.    Extremities: NO cyanosis noted, no clubbing.   No edema noted.  2+dorsalis pedis pulses.    Normal-not antalgic, steady gait.    No visits with results within 3 Month(s) from this visit.   Latest known visit with results is:   [facility] Encounter on 01/06/2023   Component Date Value Ref Range Status    Pathology Report 01/06/2023    Final                    Value:    Accession #:      Date of Procedure:  1/6/2023       Pathologist: [facility], Cytology  Date Reported: 1/16/2023  Date Received:  1/6/2023  Submitting Physician: LETY HERMOSILLO M.D.                    FINAL CYTOLOGICAL INTERPRETATION        A.  THINPREP PAP CERVICAL:              Specimen Adequacy:       SATISFACTORY FOR EVALUATION.       Quality Indicator: Absence of endocervical/transformation zone component.              General Categorization:       NEGATIVE FOR INTRAEPITHELIAL LESION OR MALIGNANCY.              Descriptive Interpretation:       SHIFT IN VAGINAL ROSANNE SUGGESTIVE OF BACTERIAL VAGINOSIS.                            HIGH RISK HPV TEST RESULT:                       HPV GENOTYPE  16                      NEGATIVE       HPV GENOTYPE  18                      NEGATIVE       HPV GENOTYPE  OTHER             NEGATIVE              Reference Range: Negative                  Slide(s) initially screened by a Cytotechnologist at [facility], [address] [address]  Testing for high-risk (HR) type of human papilloma virus (HPV) is performed by  the Roche lorna HPV Test.   The lorna HPV Test is a qualitative polymerase chain  reaction that amplifies DNA of HPV16, HPV18 and 12 other high-risk HPV types  (31, 33, 35, 39, 45, 51, 52, 56, 58, 59, 66, and 68) associated with cervical  cancer and its precursor lesions.  A positive result indicates the presence of  HPV DNA due to one or more of the 14 genotypes: 16, 18, 31, 33, 35, 39, 45, 51,  52, 56, 58, 59, 66, and 68. Negative results indicate HPV DNA concentrations  are undetectable or below the pre-set threshold for detection. False negative  results may be associated with unoptimized sampling. A negative HR HPV result  does not exclude the possibility of future cytologic HSIL or underlying CIN2-3  or cancer.    This test is approved for cervical specimens by  the US Food and Drug  Administration. Results of this test s                          hould be interpreted in conjunction with  the patient's Pap test results.  Please refer to ASCCP current guidelines for  the use of HPV DNA testing, result interpretation, and patient management.   The performance of this test was verified by the Molecular Diagnostic  Laboratory at [facility]. The lab is  certified under the Clinical Laboratory Amendments of 1988 (CLIA 88) as  qualified to perform high complexity clinical laboratory testing.    This specimen has been analyzed by the efw-suhlPrep Imaging System ([employer]  an automated imaging and review system, which assists the laboratory in  evaluating cells on ThinPrep Pap tests. Following automated imaging, selected  fields from every slide were reviewed by a cytotechnologist and/or pathologist.    Electronically Signed Out By [facility], Cytology//MXG   By the signature on this report, the individual or group listed as making the  Final Interpretation/Diagnosis certifies                           that they have reviewed this case.  Diagnostic interpretation performed at [facility]  [facility] [address] [place] [address]  Educational Note:  Cervical cytology is a screening procedure primarily for squamous cancers and  precursors and has associated false-negative and false-positive results as  evidenced by published data.  Your patient's test should be interpreted in this  context, together with patient's history and clinical findings.  Regular  sampling and follow-up of unexplained clinical signs and symptoms are  recommended to minimize false negative results.         Clinical History  Date of Last Menstrual Period:     AMENORRHEA WITH IUD    Contraceptive History:  IUD  Other Clinical Conditions:  HPV Test for All Interpretations - Include HPV Genotype    Clinical Diagnosis History: Cervical cancer screening - (Z12.4)   Source of Specimen  A: THINPREP PAP CERVICAL            [facility]  Department of Pathology   [id number] [place] [id number]        CONVERTED FINAL DIAGNOSIS 01/06/2023    Final                    Value:A.  THINPREP PAP CERVICAL:              Specimen Adequacy:       SATISFACTORY FOR EVALUATION.       Quality Indicator: Absence of endocervical/transformation zone component.              General Categorization:       NEGATIVE FOR INTRAEPITHELIAL LESION OR MALIGNANCY.              Descriptive Interpretation:       SHIFT IN VAGINAL ROSANNE SUGGESTIVE OF BACTERIAL VAGINOSIS.                            HIGH RISK HPV TEST RESULT:                       HPV GENOTYPE  16                      NEGATIVE       HPV GENOTYPE  18                      NEGATIVE       HPV GENOTYPE  OTHER             NEGATIVE              Reference Range: Negative                    CONVERTED CLINICAL DIAGNOSIS-HISTO* 01/06/2023 Clinical Diagnosis History: Cervical cancer screening - (Z12.4)   Final    CONVERTED DIAGNOSIS COMMENT 01/06/2023    Final                    Value:Slide(s) initially screened by a Cytotechnologist at  [facility], [address] [address]  Testing for high-risk (HR) type of human papilloma virus (HPV) is performed by  the Roche lorna HPV Test.  The lorna HPV Test is a qualitative polymerase chain  reaction that amplifies DNA of HPV16, HPV18 and 12 other high-risk HPV types  (31, 33, 35, 39, 45, 51, 52, 56, 58, 59, 66, and 68) associated with cervical  cancer and its precursor lesions.  A positive result indicates the presence of  HPV DNA due to one or more of the 14 genotypes: 16, 18, 31, 33, 35, 39, 45, 51,  52, 56, 58, 59, 66, and 68. Negative results indicate HPV DNA concentrations  are undetectable or below the pre-set threshold for detection. False negative  results may be associated with unoptimized sampling. A negative HR HPV result  does not exclude the possibility of future cytologic HSIL or underlying CIN2-3  or cancer.    This test is approved for cervical specimens                           by  the US Food and Drug  Administration. Results of this test should be interpreted in conjunction with  the patient's Pap test results.  Please refer to ASCCP current guidelines for  the use of HPV DNA testing, result interpretation, and patient management.   The performance of this test was verified by the Molecular Diagnostic  Laboratory at [facility]. The lab is  certified under the Clinical Laboratory Amendments of 1988 (CLIA 88) as  qualified to perform high complexity clinical laboratory testing.    This specimen has been analyzed by the ThinPrep Imaging System ([employer]  an automated imaging and review system, which assists the laboratory in  evaluating cells on ThinPrep Pap tests. Following automated imaging, selected  fields from every slide were reviewed by a cytotechnologist and/or pathologist.      CONVERTED FINAL REPORT PDF LINK TO* 01/06/2023 \\copathshare\copath\PDF 2022_[id number]   Final         Assessment/Plan     Problem List Items Addressed This Visit       Benign essential hypertension    Relevant Orders    CBC and Auto Differential    Comprehensive Metabolic Panel    Hemoglobin A1C    Lipid Panel    Thyroid Stimulating Hormone    Thyroxine, Free    Anxiety and depression    Relevant Orders    CBC and Auto Differential    Comprehensive Metabolic Panel    Hemoglobin A1C    Lipid Panel    Thyroid Stimulating Hormone    Thyroxine, Free     Other Visit Diagnoses       Left elbow pain    -  Primary    Relevant Orders    XR elbow left 1-2 views    CBC and Auto Differential    Comprehensive Metabolic Panel    Hemoglobin A1C    Lipid Panel    Thyroid Stimulating Hormone    Thyroxine, Free    Encounter for screening mammogram for breast cancer        Relevant Orders    BI mammo bilateral screening tomosynthesis    CBC and Auto Differential    Comprehensive Metabolic Panel    Hemoglobin A1C    Lipid Panel    Thyroid Stimulating Hormone    Thyroxine, Free    Colon cancer screening        Relevant Orders    Colonoscopy Screening; Average Risk Patient    CBC and Auto Differential    Comprehensive Metabolic Panel    Hemoglobin A1C    Lipid Panel    Thyroid Stimulating Hormone    Thyroxine, Free          6mo bps  The patient is aware that results will be forthcoming of ALL planned labs and or tests. If no results are received on my chart or by letter within 1 - 3 weeks, the patient is aware they need to call to obtain results, as this is not usual. Also, if any new conditions arise, or current condition worsens, it is understood that sooner appointment should be made or urgent care/convenient care or emergency room treatment should be sought depending on severity. Otherwise follow up for recheck at regular intervals as we have discussed, at least yearly.    Follow up at next scheduled visit -as planned or directed today.  Sooner if new or unresolved issues of concern.    Consider ortho

## 2025-02-05 NOTE — PROGRESS NOTES
Covid vax: x 3  Flu: UTD  Shingles: advised    CRC: due 2025-ordered  Mammogram: 2/2024-ordered  Pap: 1/2023  Lmp: IUD

## 2025-02-06 LAB
ALBUMIN SERPL-MCNC: 4.8 G/DL (ref 3.6–5.1)
ALP SERPL-CCNC: 61 U/L (ref 37–153)
ALT SERPL-CCNC: 12 U/L (ref 6–29)
ANION GAP SERPL CALCULATED.4IONS-SCNC: 10 MMOL/L (CALC) (ref 7–17)
AST SERPL-CCNC: 12 U/L (ref 10–35)
BASOPHILS # BLD AUTO: 52 CELLS/UL (ref 0–200)
BASOPHILS NFR BLD AUTO: 0.9 %
BILIRUB SERPL-MCNC: 0.7 MG/DL (ref 0.2–1.2)
BUN SERPL-MCNC: 14 MG/DL (ref 7–25)
CALCIUM SERPL-MCNC: 9.5 MG/DL (ref 8.6–10.4)
CHLORIDE SERPL-SCNC: 105 MMOL/L (ref 98–110)
CHOLEST SERPL-MCNC: 191 MG/DL
CHOLEST/HDLC SERPL: 3.2 (CALC)
CO2 SERPL-SCNC: 27 MMOL/L (ref 20–32)
CREAT SERPL-MCNC: 0.61 MG/DL (ref 0.5–1.03)
EGFRCR SERPLBLD CKD-EPI 2021: 106 ML/MIN/1.73M2
EOSINOPHIL # BLD AUTO: 238 CELLS/UL (ref 15–500)
EOSINOPHIL NFR BLD AUTO: 4.1 %
ERYTHROCYTE [DISTWIDTH] IN BLOOD BY AUTOMATED COUNT: 13.1 % (ref 11–15)
EST. AVERAGE GLUCOSE BLD GHB EST-MCNC: 105 MG/DL
EST. AVERAGE GLUCOSE BLD GHB EST-SCNC: 5.8 MMOL/L
GLUCOSE SERPL-MCNC: 85 MG/DL (ref 65–99)
HBA1C MFR BLD: 5.3 % OF TOTAL HGB
HCT VFR BLD AUTO: 43.5 % (ref 35–45)
HDLC SERPL-MCNC: 60 MG/DL
HGB BLD-MCNC: 14.5 G/DL (ref 11.7–15.5)
LDLC SERPL CALC-MCNC: 113 MG/DL (CALC)
LYMPHOCYTES # BLD AUTO: 1636 CELLS/UL (ref 850–3900)
LYMPHOCYTES NFR BLD AUTO: 28.2 %
MCH RBC QN AUTO: 28.7 PG (ref 27–33)
MCHC RBC AUTO-ENTMCNC: 33.3 G/DL (ref 32–36)
MCV RBC AUTO: 86.1 FL (ref 80–100)
MONOCYTES # BLD AUTO: 360 CELLS/UL (ref 200–950)
MONOCYTES NFR BLD AUTO: 6.2 %
NEUTROPHILS # BLD AUTO: 3515 CELLS/UL (ref 1500–7800)
NEUTROPHILS NFR BLD AUTO: 60.6 %
NONHDLC SERPL-MCNC: 131 MG/DL (CALC)
PLATELET # BLD AUTO: 328 THOUSAND/UL (ref 140–400)
PMV BLD REES-ECKER: 9.4 FL (ref 7.5–12.5)
POTASSIUM SERPL-SCNC: 4.6 MMOL/L (ref 3.5–5.3)
PROT SERPL-MCNC: 6.7 G/DL (ref 6.1–8.1)
RBC # BLD AUTO: 5.05 MILLION/UL (ref 3.8–5.1)
SODIUM SERPL-SCNC: 142 MMOL/L (ref 135–146)
T4 FREE SERPL-MCNC: 1.2 NG/DL (ref 0.8–1.8)
TRIGL SERPL-MCNC: 83 MG/DL
TSH SERPL-ACNC: 0.74 MIU/L
WBC # BLD AUTO: 5.8 THOUSAND/UL (ref 3.8–10.8)

## 2025-02-10 ENCOUNTER — APPOINTMENT (OUTPATIENT)
Dept: RADIOLOGY | Facility: HOSPITAL | Age: 55
End: 2025-02-10
Payer: COMMERCIAL

## 2025-02-13 ENCOUNTER — APPOINTMENT (OUTPATIENT)
Dept: PHARMACY | Facility: HOSPITAL | Age: 55
End: 2025-02-13
Payer: COMMERCIAL

## 2025-02-13 NOTE — ASSESSMENT & PLAN NOTE
Current regimen includes semaglutide 0.6 mg weekly. Patient's current weight reported as 138 pounds. Has lost 15 lbs (9.8% of TBW) since starting therapy plan. Given patient has reached BMI goal, will plan to have patient take semaglutide 0.6 mg every 10 days rather than every 7 days. May consider discontinuation of therapy if weight loss continues.    Medication Changes:  START  Semaglutide 0.6 mg subcutaneously once weekly every 10 days.    Spoke with patient today regarding [facility] pharmacy for weight loss medications. Explained to patient that these are not FDA approved and are not given at approved dosing, therefore it cannot be guaranteed that the medication will have the same effectiveness and safety that prescription Ozempic/Wegovy/Mounjaro/Zepbound do. Patient agreeable to proceed with semaglutide. Counseled patient on MOA, expectations, side effects, duration of therapy, contraindications, administration, and monitoring parameters. Reviewed no history of MTC nor pancreatitis. Answered all patient questions and concerns; provided [facility] phone number if issues/questions arise.

## 2025-02-13 NOTE — PROGRESS NOTES
Clinical Pharmacy Appointment    Patient ID: Monet Aguilar is a 54 y.o. female who presents for Weight Loss.    Pt is here for Follow Up appointment.     Referring Provider: Sherlyn Malave, *  PCP: Sherlyn Malave MD   Last visit with PCP:  2.5.2025   Next visit with PCP: Not scheduled    Subjective   Interval History:  Saw PCP/labs  LDL slightly high; non-fasting lab completed  BMI now at goal  BP still a little elevated in office but home BP mostly at goal  Continues with semaglutide to 0.6 mg  SE: Tolerable nausea    HPI  WEIGHT LOSS  BMI Readings from Last 3 Encounters:   02/05/25 25.24 kg/m²   08/05/24 27.98 kg/m²   01/10/24 28.35 kg/m²      Starting weight: 153 lbs. (8.5.2024)  - 150.4 lbs (12.4.2024)  - 143 pounds (1.2.2025)  Current weight: 138 pounds (2.5.2025)    Lifestyle  Diet: vegetarian; does endorse in seafood occasionally  Physical Activity: Patient confirms active lifestyle; nurse    Non-Pharmacological Therapy  Weight loss techniques attempted:  Self-directed dieting: patient vegetarian, does endorse in some dairy products/occasional seafood    Pharmacological Therapy  Current Medications: semaglutide 0.6 mg subcutaneously once weekly    Insurance coverage of weight-loss medications? No, patient does not have prescription coverage    Drug Interactions  No relevant drug interactions were noted.    Objective   No Known Allergies  Social History     Social History Narrative    Not on file      Medication Review  Current Outpatient Medications   Medication Instructions    amLODIPine (NORVASC) 5 mg, oral, 2 times daily    busPIRone (BUSPAR) 10 mg, oral, 3 times daily PRN    estradiol (ESTRACE) 0.5 mg, Daily RT    levonorgestrel (Mirena) 21 mcg/24 hours (8 yrs) 52 mg IUD 1 each, Once    multivitamin tablet 1 tablet, Daily    sertraline (ZOLOFT) 25 mg, oral, Daily    Wegovy 0.25 mg, subcutaneous, Weekly, Inject 0.6 mg subcutaneously once weekly. Qty: 4 pens. 0 refills      Vitals  BP  "Readings from Last 2 Encounters:   02/05/25 146/82   08/05/24 148/78     BMI Readings from Last 1 Encounters:   02/05/25 25.24 kg/m²      Labs  A1C  Lab Results   Component Value Date    HGBA1C 5.3 02/05/2025    HGBA1C 5.4 02/01/2024    HGBA1C 5.5 01/18/2023     BMP  Lab Results   Component Value Date    CALCIUM 9.5 02/05/2025     02/05/2025    K 4.6 02/05/2025    CO2 27 02/05/2025     02/05/2025    BUN 14 02/05/2025    CREATININE 0.61 02/05/2025    EGFR 106 02/05/2025     LFTs  Lab Results   Component Value Date    ALT 12 02/05/2025    AST 12 02/05/2025    ALKPHOS 61 02/05/2025    BILITOT 0.7 02/05/2025     FLP  Lab Results   Component Value Date    TRIG 83 02/05/2025    CHOL 191 02/05/2025    LDLF 107 (H) 01/27/2022    LDLCALC 113 (H) 02/05/2025    HDL 60 02/05/2025     Urine Microalbumin  No results found for: \"MICROALBCREA\"  Weight Management  Wt Readings from Last 3 Encounters:   02/05/25 62.6 kg (138 lb)   08/05/24 69.4 kg (153 lb)   01/10/24 70.3 kg (155 lb)      There is no height or weight on file to calculate BMI.     Assessment/Plan   Problem List Items Addressed This Visit       BMI 27.0-27.9,adult     Current regimen includes semaglutide 0.6 mg weekly. Patient's current weight reported as 138 pounds. Has lost 15 lbs (9.8% of TBW) since starting therapy plan. Given patient has reached BMI goal, will plan to have patient take semaglutide 0.6 mg every 10 days rather than every 7 days. May consider discontinuation of therapy if weight loss continues.    Medication Changes:  START  Semaglutide 0.6 mg subcutaneously once weekly every 10 days.    Spoke with patient today regarding [facility] pharmacy for weight loss medications. Explained to patient that these are not FDA approved and are not given at approved dosing, therefore it cannot be guaranteed that the medication will have the same effectiveness and safety that prescription Ozempic/Wegovy/Mounjaro/Zepbound do. Patient agreeable to " proceed with semaglutide. Counseled patient on MOA, expectations, side effects, duration of therapy, contraindications, administration, and monitoring parameters. Reviewed no history of MTC nor pancreatitis. Answered all patient questions and concerns; provided [facility] phone number if issues/questions arise.          Other Visit Diagnoses       BMI 28.0-28.9,adult              Clinical Pharmacist follow-up: 3/20/2025 @ 9 am, Telehealth visit    Continue all meds under the continuation of care with the referring provider and clinical pharmacy team.    Thank you,  Hien Cash, PharmD  Clinical Pharmacist  [phone]    Verbal consent to manage patient's drug therapy was obtained from the patient. They were informed they may decline to participate or withdraw from participation in pharmacy services at any time.

## 2025-03-20 ENCOUNTER — APPOINTMENT (OUTPATIENT)
Dept: PHARMACY | Facility: HOSPITAL | Age: 55
End: 2025-03-20
Payer: COMMERCIAL

## 2025-03-24 ENCOUNTER — APPOINTMENT (OUTPATIENT)
Dept: RADIOLOGY | Facility: HOSPITAL | Age: 55
End: 2025-03-24
Payer: COMMERCIAL

## 2025-04-16 ENCOUNTER — HOSPITAL ENCOUNTER (OUTPATIENT)
Dept: RADIOLOGY | Facility: HOSPITAL | Age: 55
Discharge: HOME | End: 2025-04-16
Payer: COMMERCIAL

## 2025-04-16 VITALS — BODY MASS INDEX: 25.24 KG/M2 | WEIGHT: 138 LBS

## 2025-04-16 DIAGNOSIS — Z12.31 ENCOUNTER FOR SCREENING MAMMOGRAM FOR BREAST CANCER: ICD-10-CM

## 2025-04-16 PROCEDURE — 77067 SCR MAMMO BI INCL CAD: CPT | Performed by: RADIOLOGY

## 2025-04-16 PROCEDURE — 77063 BREAST TOMOSYNTHESIS BI: CPT | Performed by: RADIOLOGY

## 2025-04-16 PROCEDURE — 77067 SCR MAMMO BI INCL CAD: CPT

## 2025-04-17 ENCOUNTER — APPOINTMENT (OUTPATIENT)
Dept: RADIOLOGY | Facility: HOSPITAL | Age: 55
End: 2025-04-17
Payer: COMMERCIAL

## 2025-04-18 ENCOUNTER — HOSPITAL ENCOUNTER (OUTPATIENT)
Dept: RADIOLOGY | Facility: EXTERNAL LOCATION | Age: 55
Discharge: HOME | End: 2025-04-18

## 2025-06-18 ENCOUNTER — TELEPHONE (OUTPATIENT)
Dept: PHARMACY | Facility: HOSPITAL | Age: 55
End: 2025-06-18

## 2025-06-18 ENCOUNTER — TELEPHONE VISIT (OUTPATIENT)
Dept: PRIMARY CARE | Facility: CLINIC | Age: 55
End: 2025-06-18
Payer: COMMERCIAL

## 2025-06-18 DIAGNOSIS — R63.5 WEIGHT GAIN: Primary | ICD-10-CM

## 2025-06-18 DIAGNOSIS — R73.9 HYPERGLYCEMIA: ICD-10-CM

## 2025-06-18 RX ORDER — SEMAGLUTIDE 0.25 MG/.5ML
0.25 INJECTION, SOLUTION SUBCUTANEOUS WEEKLY
Qty: 2 ML | Refills: 3 | Status: SHIPPED | OUTPATIENT
Start: 2025-06-18 | End: 2025-10-02

## 2025-06-18 RX ORDER — SEMAGLUTIDE 0.25 MG/.5ML
0.25 INJECTION, SOLUTION SUBCUTANEOUS WEEKLY
Qty: 2 ML | Refills: 2 | Status: SHIPPED | OUTPATIENT
Start: 2025-06-18 | End: 2025-07-10

## 2025-06-18 NOTE — TELEPHONE ENCOUNTER
06/18/25    Number contacted: [phone]  Referring Provider: Sherlyn Malave MD  Referral Reason: Weight Loss    Reached out to Monet Aguilar to discuss restart of Wegovy. Patient was taking compounded Semaglutide in the past through [facility], however stopped 2 months ago to work on lifestyle changes. Insurance is not paying for weight loss medications at this time.     Patient would like to restart GLP1 due to putting weight back on. Discussed special offer through [employer] until July which will bring cost down to $199/month and then $499/month thereafter starting in August.     Patient is agreeable to get Wegovy through this offer at [facility]. Advised patient on how to sign up for savings card through website. Discussed that we can switch back to [facility] in August if they are still compounding medication if needed.     Thank you,  Sydnie Dixon, PharmD  Clinical Pharmacist

## 2025-06-18 NOTE — PROGRESS NOTES
HAL BAUER  Will make this an evisit since I am prescribing a med  You MAY need to get filled NOT at compounding so I will consult our pharmacy team AND send to your local pharmacy(compounding are having some issues restricting them) so you can do cash pay for savings.  I sent to both pharms for you to see.([facility], [facility])  Make sure labs in 6months and every 6months if you remain on it and I trust your BMI is over 30 to start med :)    The purpose of an E VISIT is a convenient, affordable way to receive a treatment plan for mild to moderate illnesses or problems. An E VISIT is limited in its ability to diagnose and treat and is not as thorough nor helpful as an in person visit and examination. It must be understood that nothing replaces EMERGENCY ROOM CARE /Calling 911 for emergencies or any pain of severe nature-not limited to but certainly including chest, abdominal, or headache pain and any bleeding that cannot be easily stopped, dizziness/ light headedness and many other urgent symptoms NOT mentioned some include symptoms of heart attack or stroke.     We are doing an E VISIT to help YOU as a convenience to get tests ordered or treatments started-saving you time. Especially since multiple messages are sent each day to our office and some medical issues can be streamlined or investigations and/ or treatment started by Email methods. However, many are NOT conducive to this email type communication.  Also-we may initially select an email visit as appropriate but unwanted side effects of medicine or new concerns may arise-in which case -another E VISIT is INAPPROPRIATE and should result in an in-person visit.  Thank you.    Dr teixeira

## 2025-07-16 ENCOUNTER — TELEPHONE (OUTPATIENT)
Dept: PHARMACY | Facility: HOSPITAL | Age: 55
End: 2025-07-16
Payer: COMMERCIAL

## 2025-07-16 RX ORDER — SEMAGLUTIDE 0.25 MG/.5ML
0.25 INJECTION, SOLUTION SUBCUTANEOUS WEEKLY
Qty: 2 ML | Refills: 3 | Status: SHIPPED | OUTPATIENT
Start: 2025-07-16 | End: 2025-10-30

## 2025-07-16 NOTE — TELEPHONE ENCOUNTER
07/16/25    Number contacted: [phone]  Referring Provider: Sherlyn Malave MD  Referral Reason: Weight Loss    Patient reached out to [facility] to request refill of Semaglutide. Patient would like to go back to compounded medication at [facility] due to better efficacy and convenience as compared to [facility].    Will send prescription for Semaglutide 0.6 mg once weekly to [facility] in [place] for patient pickup.     Thank you,  Sydnie Dixon, PharmD  Clinical Pharmacist

## 2025-08-01 DIAGNOSIS — F32.A ANXIETY AND DEPRESSION: ICD-10-CM

## 2025-08-01 DIAGNOSIS — F41.9 ANXIETY AND DEPRESSION: ICD-10-CM

## 2025-08-02 RX ORDER — SERTRALINE HYDROCHLORIDE 25 MG/1
25 TABLET, FILM COATED ORAL DAILY
Qty: 90 TABLET | Refills: 1 | Status: SHIPPED | OUTPATIENT
Start: 2025-08-02